# Patient Record
Sex: FEMALE | Race: WHITE | NOT HISPANIC OR LATINO | Employment: OTHER | URBAN - METROPOLITAN AREA
[De-identification: names, ages, dates, MRNs, and addresses within clinical notes are randomized per-mention and may not be internally consistent; named-entity substitution may affect disease eponyms.]

---

## 2017-01-05 ENCOUNTER — ALLSCRIPTS OFFICE VISIT (OUTPATIENT)
Dept: OTHER | Facility: OTHER | Age: 57
End: 2017-01-05

## 2017-01-20 ENCOUNTER — HOSPITAL ENCOUNTER (OUTPATIENT)
Dept: RADIOLOGY | Facility: CLINIC | Age: 57
Discharge: HOME | End: 2017-01-20
Payer: MEDICARE

## 2017-01-20 ENCOUNTER — ALLSCRIPTS OFFICE VISIT (OUTPATIENT)
Dept: OTHER | Facility: OTHER | Age: 57
End: 2017-01-20

## 2017-01-20 DIAGNOSIS — M25.519 PAIN IN SHOULDER: ICD-10-CM

## 2017-01-20 DIAGNOSIS — M25.512 PAIN IN LEFT SHOULDER: ICD-10-CM

## 2017-01-20 PROCEDURE — 73030 X-RAY EXAM OF SHOULDER: CPT

## 2017-01-26 ENCOUNTER — HOSPITAL ENCOUNTER (OUTPATIENT)
Dept: CT IMAGING | Facility: HOSPITAL | Age: 57
Discharge: HOME | End: 2017-01-26
Attending: ORTHOPAEDIC SURGERY
Payer: MEDICARE

## 2017-01-26 DIAGNOSIS — M25.512 PAIN IN LEFT SHOULDER: ICD-10-CM

## 2017-01-26 PROCEDURE — 73200 CT UPPER EXTREMITY W/O DYE: CPT

## 2017-01-31 ENCOUNTER — ALLSCRIPTS OFFICE VISIT (OUTPATIENT)
Dept: OTHER | Facility: OTHER | Age: 57
End: 2017-01-31

## 2017-08-04 ENCOUNTER — ALLSCRIPTS OFFICE VISIT (OUTPATIENT)
Dept: OTHER | Facility: OTHER | Age: 57
End: 2017-08-04

## 2018-01-10 NOTE — CONSULTS
Assessment    1  Impingement syndrome of left shoulder (726 2) (M75 42)   2  Biceps tendinitis of left upper extremity (726 12) (M75 22)   3  Trapezius muscle spasm (728 85) (M62 838)   4  Strain of left trapezius muscle, subsequent encounter (V58 89,840 8) (E51 868K)    Plan  Impingement syndrome of left shoulder    · 1 - Nikki Balderrama MD, Kasey Millard  (Orthopedic Surgery) Physician Referral  Consult Only: the  expectation is that the referring provider will communicate back to the patient on  treatment options  Evaluation and Treatment: the expectation is that the referred to  provider will communicate back to the patient on treatment options  Status: Active   Requested for: [id number]  Care Summary provided  : Yes  Strain of left trapezius muscle, subsequent encounter    · Indomethacin 25 MG Oral Capsule; Take one capsule with breakfanst and one with  evening meal  Strain of left trapezius muscle, subsequent encounter, Trapezius muscle spasm    · Follow-up visit in 6 weeks Evaluation and Treatment  Follow-up  Status: Hold For -  Scheduling  Requested for: [id number]    Discussion/Summary  Impression: seems like more of a shoulder problem  Currently, the condition is improving  Medication changes are as documented in orders  Treatment plan includes Orthopedic consultation possible injection  Patient discussion: discussed with the patient  Chief Complaint  PCP consult for left neck and shoulder pain  History of Present Illness  64 yo RHD female with Hx of cervical fusion 10 years ago at [facility]) which was helpful  Had left RTC repair 2005 Dr Laisha Vargas   also helpful  Last few months having non-traumatic pain in left trap area, had some numbness in left hand but that has resolved with four weeks of PTx   is scheduled to continue  No gait problems, no bowel or bladder incontinence  Had one x-ray at facility   "arthritis"        Review of Systems    Cardiovascular: No complaints of chest pain, no palpitations, no leg claudication or lower extremity edema  Respiratory: asthmatic  Musculoskeletal: as noted in HPI  Integumentary: anterior chest and a rash  Neurological: transient  Endocrine: muscle weakness, but No complaints of muscle weakness, no feelings of weakness, no frequent urination, no excessive thirst      ROS reviewed  Active Problems    1  Bipolar disorder (296 80) (F31 9)   2  Contusion of left foot (924 20) (S90 32XA)   3  Depression (311) (F32 9)   4  Hyperlipidemia (272 4) (E78 5)   5  Hypertension (401 9) (I10)   6  Hypothyroidism (244 9) (E03 9)   7  Left foot pain (729 5) (M79 672)   8  Schizoaffective disorder (295 70) (F25 9)    Past Medical History    1  History of Depression with anxiety (300 4) (F41 8)   2  History of asthma (V12 69) (Z87 09)   3  History of Psychogenic disorder (300 9) (F48 9)   4  History of Thyroid trouble (246 9) (E07 9)    The active problems and past medical history were reviewed and updated today  Surgical History    1  History of Back Surgery   2  History of Cervical Vertebral Fusion   3  History of  Section   4  History of Shoulder Surgery    The surgical history was reviewed and updated today  Family History  Mother    1  Maternal history of Cancer (199 1) (C80 1)   2  Maternal history of Heart disease (429 9) (I51 9)  Father    3  Maternal history of Cancer (199 1) (C80 1)   4  Maternal history of Heart disease (429 9) (I51 9)  Family History    5  Family history of cerebrovascular accident (CVA) (V17 1) (Z82 3)   6  Family history of diabetes mellitus (V18 0) (Z83 3)   7  Family history of hypertension (V17 49) (Z82 49)   8  Family history of neuropathy (V17 2) (Z82 0)   9  Family history of thyroid disease (V18 19) (Z83 49)    The family history was reviewed and updated today  Social History    · Never a smoker   · No alcohol use  The social history was reviewed and is unchanged  Current Meds   1   AmLODIPine Besylate 2 5 MG Oral Tablet; TAKE 1 TABLET DAILY; Therapy: (Recorded:38Sbt6002) to Recorded   2  Breo Ellipta 100-25 MCG/INH Inhalation Aerosol Powder Breath Activated; Therapy: (Recorded:30Sep2016) to Recorded   3  CeleBREX 200 MG Oral Capsule; take 1 capsule daily; Therapy: (Recorded:91Hzw7075) to Recorded   4  ClonazePAM 0 5 MG Oral Tablet; TAKE 1 TABLET 3 TIMES DAILY; Therapy: (Recorded:30Sep2016) to Recorded   5  DULoxetine HCl - 30 MG Oral Capsule Delayed Release Particles; Therapy: (Recorded:64Cjl6556) to Recorded   6  Flonase 50 MCG/ACT SUSP; Therapy: (Recorded:30Sep2016) to Recorded   7  Folic Acid 1 MG Oral Tablet; Take 1 tablet daily; Therapy: (Recorded:30Sep2016) to Recorded   8  Gabapentin 300 MG TABS; TAKE 1 TABLET 3 TIMES DAILY; Therapy: (Recorded:30Sep2016) to Recorded   9  HydrOXYzine HCl - 25 MG Oral Tablet; TAKE 1 TABLET Every 6 hours; Therapy: (Recorded:30Sep2016) to Recorded   10  LamoTRIgine 100 MG Oral Tablet; TAKE 1 TABLET DAILY; Therapy: (Recorded:46Wsg2196) to Recorded   11  Latuda 80 MG Oral Tablet; TAKE 1 TABLET Bedtime; Therapy: (Recorded:30Sep2016) to Recorded   12  Levothyroxine Sodium 125 MCG Oral Tablet; TAKE 1 TABLET DAILY; Therapy: (Recorded:61Qsf5625) to Recorded   13  Oxybutynin Chloride 5 MG Oral Tablet; Take 1 tablet daily; Therapy: (Recorded:51Ftc5012) to Recorded   14  Oxycodone-Acetaminophen 5-325 MG Oral Tablet; TAKE 1 TABLET 3 times daily PRN; Therapy: (Recorded:68Xyi2031) to Recorded   15  Pravastatin Sodium 40 MG Oral Tablet; TAKE 1 TABLET DAILY; Therapy: (Recorded:16Okr0897) to Recorded   16  ProAir  (90 Base) MCG/ACT Inhalation Aerosol Solution; Therapy: (Recorded:57Fdu3531) to Recorded   17  Vitamin D3 40847 UNIT Oral Capsule; 1 CAP PO MONTHLY; Therapy: (Recorded:49Jli5201) to Recorded    Allergies    1  morphine   2  Mucinex TB12   3  Penicillins   4  Tetracycline HCl CAPS   5   Tylenol with Codeine #3 TABS    Vitals  Signs   Recorded: 28SYO0515 02:20PM   Heart Rate: 76  Systolic: 698  Diastolic: 96  Height: 5 ft 4 in  Weight: 255 lb   BMI Calculated: 43 77  BSA Calculated: 2 18    Physical Exam  No focal or lateralizing weakness in UEs, no long tract signs  Abduction: painful restricted AROM 100 deg degrees  External rotation: painful restricted AROM 45 deg degrees  Special Tests: positive Painful Arc, positive Remy test, positive Neer test, positive Empty Can test, positive Speed's test and positive Hornblowers' test, but negative Drop Arm test    Constitutional - General appearance: Abnormal  morbidly obese  Musculoskeletal - Gait and station: Normal  Digits and nails: Normal  Inspection/palpation of joints, bones, and muscles: Normal  Muscle strength/tone: Normal    Cardiovascular - Pulses: Normal    Neurologic - Cranial nerves: Normal  Reflexes: Abnormal  Deep tendon reflexes: 1+ right biceps, 1+ left biceps, 1+ right triceps, 1+ left triceps, 1+ right brachioradialis, 1+ left brachioradialis, 0 right patella, 0 left patella, 0 right ankle jerk and 0 left ankle jerkno ankle clonus on the right and no ankle clonus on the left        Signatures   Electronically signed by : Abbey Barajas DO; Jan 5 2017  2:47PM EST                       (Author)

## 2018-01-12 VITALS
HEIGHT: 64 IN | SYSTOLIC BLOOD PRESSURE: 130 MMHG | BODY MASS INDEX: 43.54 KG/M2 | HEART RATE: 76 BPM | DIASTOLIC BLOOD PRESSURE: 96 MMHG | WEIGHT: 255 LBS

## 2018-01-13 VITALS
WEIGHT: 255 LBS | DIASTOLIC BLOOD PRESSURE: 70 MMHG | SYSTOLIC BLOOD PRESSURE: 176 MMHG | HEIGHT: 64 IN | HEART RATE: 69 BPM | BODY MASS INDEX: 43.54 KG/M2

## 2018-01-13 VITALS — DIASTOLIC BLOOD PRESSURE: 100 MMHG | HEART RATE: 87 BPM | SYSTOLIC BLOOD PRESSURE: 173 MMHG

## 2018-01-14 VITALS — DIASTOLIC BLOOD PRESSURE: 79 MMHG | SYSTOLIC BLOOD PRESSURE: 147 MMHG | HEART RATE: 83 BPM

## 2018-04-10 ENCOUNTER — HOSPITAL ENCOUNTER (INPATIENT)
Facility: HOSPITAL | Age: 58
LOS: 8 days | Discharge: NURSING, REHAB OR LONG-TERM CARE | DRG: 885 | End: 2018-04-18
Attending: EMERGENCY MEDICINE | Admitting: PSYCHIATRY & NEUROLOGY
Payer: MEDICARE

## 2018-04-10 DIAGNOSIS — F25.0 SCHIZOAFFECTIVE DISORDER, BIPOLAR TYPE (HCC): Chronic | ICD-10-CM

## 2018-04-10 DIAGNOSIS — Z00.8 MEDICAL CLEARANCE FOR PSYCHIATRIC ADMISSION: Primary | ICD-10-CM

## 2018-04-10 DIAGNOSIS — R45.851 SUICIDAL IDEATIONS: ICD-10-CM

## 2018-04-10 DIAGNOSIS — R44.3 HALLUCINATIONS: ICD-10-CM

## 2018-04-10 PROBLEM — M62.838 TRAPEZIUS MUSCLE SPASM: Status: ACTIVE | Noted: 2017-01-05

## 2018-04-10 PROBLEM — F43.10 PTSD (POST-TRAUMATIC STRESS DISORDER): Status: ACTIVE | Noted: 2017-05-17

## 2018-04-10 PROBLEM — M19.012 PRIMARY LOCALIZED OSTEOARTHROSIS OF LEFT SHOULDER: Status: ACTIVE | Noted: 2017-01-31

## 2018-04-10 LAB
ALBUMIN SERPL BCP-MCNC: 3.7 G/DL (ref 3.5–5)
ALP SERPL-CCNC: 125 U/L (ref 46–116)
ALT SERPL W P-5'-P-CCNC: 16 U/L (ref 12–78)
AMPHETAMINES SERPL QL SCN: NEGATIVE
ANION GAP SERPL CALCULATED.3IONS-SCNC: 4 MMOL/L (ref 4–13)
AST SERPL W P-5'-P-CCNC: 12 U/L (ref 5–45)
ATRIAL RATE: 71 BPM
BACTERIA UR QL AUTO: ABNORMAL /HPF
BARBITURATES UR QL: NEGATIVE
BASOPHILS # BLD AUTO: 0.04 THOUSANDS/ΜL (ref 0–0.1)
BASOPHILS NFR BLD AUTO: 1 % (ref 0–1)
BENZODIAZ UR QL: NEGATIVE
BILIRUB SERPL-MCNC: 0.38 MG/DL (ref 0.2–1)
BILIRUB UR QL STRIP: NEGATIVE
BUN SERPL-MCNC: 25 MG/DL (ref 5–25)
CALCIUM SERPL-MCNC: 9.2 MG/DL
CHLORIDE SERPL-SCNC: 104 MMOL/L (ref 100–108)
CLARITY UR: CLEAR
CO2 SERPL-SCNC: 31 MMOL/L (ref 21–32)
COCAINE UR QL: NEGATIVE
COLOR UR: YELLOW
COLOR, POC: NORMAL
CREAT SERPL-MCNC: 1.35 MG/DL (ref 0.6–1.3)
EOSINOPHIL # BLD AUTO: 0.55 THOUSAND/ΜL (ref 0–0.61)
EOSINOPHIL NFR BLD AUTO: 6 % (ref 0–6)
ERYTHROCYTE [DISTWIDTH] IN BLOOD BY AUTOMATED COUNT: 14.6 % (ref 11.6–15.1)
ETHANOL EXG-MCNC: NEGATIVE MG/DL
GFR SERPL CREATININE-BSD FRML MDRD: 44 ML/MIN/1.73SQ M
GLUCOSE SERPL-MCNC: 130 MG/DL (ref 65–140)
GLUCOSE UR STRIP-MCNC: NEGATIVE MG/DL
HCT VFR BLD AUTO: 42.5 % (ref 34.8–46.1)
HGB BLD-MCNC: 13.4 G/DL (ref 11.5–15.4)
HGB UR QL STRIP.AUTO: NEGATIVE
HYALINE CASTS #/AREA URNS LPF: ABNORMAL /LPF
KETONES UR STRIP-MCNC: NEGATIVE MG/DL
LEUKOCYTE ESTERASE UR QL STRIP: ABNORMAL
LYMPHOCYTES # BLD AUTO: 1.47 THOUSANDS/ΜL (ref 0.6–4.47)
LYMPHOCYTES NFR BLD AUTO: 17 % (ref 14–44)
MCH RBC QN AUTO: 27.8 PG (ref 26.8–34.3)
MCHC RBC AUTO-ENTMCNC: 31.5 G/DL (ref 31.4–37.4)
MCV RBC AUTO: 88 FL (ref 82–98)
METHADONE UR QL: NEGATIVE
MONOCYTES # BLD AUTO: 0.4 THOUSAND/ΜL (ref 0.17–1.22)
MONOCYTES NFR BLD AUTO: 5 % (ref 4–12)
NEUTROPHILS # BLD AUTO: 6.19 THOUSANDS/ΜL (ref 1.85–7.62)
NEUTS SEG NFR BLD AUTO: 71 % (ref 43–75)
NITRITE UR QL STRIP: NEGATIVE
NON-SQ EPI CELLS URNS QL MICRO: ABNORMAL /HPF
NRBC BLD AUTO-RTO: 0 /100 WBCS
OPIATES UR QL SCN: NEGATIVE
P AXIS: 50 DEGREES
PCP UR QL: NEGATIVE
PH UR STRIP.AUTO: 7 [PH] (ref 4.5–8)
PLATELET # BLD AUTO: 177 THOUSANDS/UL (ref 149–390)
PMV BLD AUTO: 9.1 FL (ref 8.9–12.7)
POTASSIUM SERPL-SCNC: 4.9 MMOL/L (ref 3.5–5.3)
PR INTERVAL: 222 MS
PROT SERPL-MCNC: 7.1 G/DL (ref 6.4–8.2)
PROT UR STRIP-MCNC: NEGATIVE MG/DL
QRS AXIS: 22 DEGREES
QRSD INTERVAL: 88 MS
QT INTERVAL: 388 MS
QTC INTERVAL: 421 MS
RBC # BLD AUTO: 4.82 MILLION/UL (ref 3.81–5.12)
RBC #/AREA URNS AUTO: ABNORMAL /HPF
SODIUM SERPL-SCNC: 139 MMOL/L (ref 136–145)
SP GR UR STRIP.AUTO: 1.01 (ref 1–1.03)
T WAVE AXIS: 51 DEGREES
THC UR QL: NEGATIVE
TSH SERPL DL<=0.05 MIU/L-ACNC: 2.52 UIU/ML (ref 0.36–3.74)
UROBILINOGEN UR QL STRIP.AUTO: 0.2 E.U./DL
VENTRICULAR RATE: 71 BPM
WBC # BLD AUTO: 8.67 THOUSAND/UL (ref 4.31–10.16)
WBC #/AREA URNS AUTO: ABNORMAL /HPF

## 2018-04-10 PROCEDURE — 80307 DRUG TEST PRSMV CHEM ANLYZR: CPT | Performed by: EMERGENCY MEDICINE

## 2018-04-10 PROCEDURE — 80053 COMPREHEN METABOLIC PANEL: CPT | Performed by: EMERGENCY MEDICINE

## 2018-04-10 PROCEDURE — 93005 ELECTROCARDIOGRAM TRACING: CPT

## 2018-04-10 PROCEDURE — 81002 URINALYSIS NONAUTO W/O SCOPE: CPT | Performed by: EMERGENCY MEDICINE

## 2018-04-10 PROCEDURE — 90715 TDAP VACCINE 7 YRS/> IM: CPT | Performed by: EMERGENCY MEDICINE

## 2018-04-10 PROCEDURE — 85025 COMPLETE CBC W/AUTO DIFF WBC: CPT | Performed by: EMERGENCY MEDICINE

## 2018-04-10 PROCEDURE — 99285 EMERGENCY DEPT VISIT HI MDM: CPT

## 2018-04-10 PROCEDURE — 81001 URINALYSIS AUTO W/SCOPE: CPT

## 2018-04-10 PROCEDURE — 87086 URINE CULTURE/COLONY COUNT: CPT

## 2018-04-10 PROCEDURE — 82075 ASSAY OF BREATH ETHANOL: CPT | Performed by: EMERGENCY MEDICINE

## 2018-04-10 PROCEDURE — 90471 IMMUNIZATION ADMIN: CPT

## 2018-04-10 PROCEDURE — 36415 COLL VENOUS BLD VENIPUNCTURE: CPT | Performed by: EMERGENCY MEDICINE

## 2018-04-10 PROCEDURE — 93010 ELECTROCARDIOGRAM REPORT: CPT | Performed by: INTERNAL MEDICINE

## 2018-04-10 PROCEDURE — 84443 ASSAY THYROID STIM HORMONE: CPT | Performed by: EMERGENCY MEDICINE

## 2018-04-10 PROCEDURE — 99232 SBSQ HOSP IP/OBS MODERATE 35: CPT | Performed by: PHYSICIAN ASSISTANT

## 2018-04-10 RX ORDER — HALOPERIDOL 5 MG
5 TABLET ORAL EVERY 6 HOURS PRN
Status: DISCONTINUED | OUTPATIENT
Start: 2018-04-10 | End: 2018-04-18 | Stop reason: HOSPADM

## 2018-04-10 RX ORDER — LORAZEPAM 1 MG/1
1 TABLET ORAL EVERY 6 HOURS PRN
Status: DISCONTINUED | OUTPATIENT
Start: 2018-04-10 | End: 2018-04-18 | Stop reason: HOSPADM

## 2018-04-10 RX ORDER — OXYBUTYNIN CHLORIDE 5 MG/1
15 TABLET, EXTENDED RELEASE ORAL DAILY
COMMUNITY
End: 2021-01-01 | Stop reason: HOSPADM

## 2018-04-10 RX ORDER — PRAVASTATIN SODIUM 40 MG
1 TABLET ORAL DAILY
COMMUNITY
End: 2021-01-01 | Stop reason: HOSPADM

## 2018-04-10 RX ORDER — ACETAMINOPHEN 325 MG/1
TABLET ORAL
COMMUNITY
End: 2018-04-10 | Stop reason: ALTCHOICE

## 2018-04-10 RX ORDER — FOLIC ACID 1 MG/1
1 TABLET ORAL
COMMUNITY
Start: 2014-08-05 | End: 2021-01-01 | Stop reason: HOSPADM

## 2018-04-10 RX ORDER — CHOLECALCIFEROL (VITAMIN D3) 125 MCG
CAPSULE ORAL
COMMUNITY
End: 2021-01-01 | Stop reason: HOSPADM

## 2018-04-10 RX ORDER — MAGNESIUM HYDROXIDE/ALUMINUM HYDROXICE/SIMETHICONE 120; 1200; 1200 MG/30ML; MG/30ML; MG/30ML
30 SUSPENSION ORAL EVERY 4 HOURS PRN
Status: DISCONTINUED | OUTPATIENT
Start: 2018-04-10 | End: 2018-04-18 | Stop reason: HOSPADM

## 2018-04-10 RX ORDER — ZIPRASIDONE MESYLATE 20 MG/ML
20 INJECTION, POWDER, LYOPHILIZED, FOR SOLUTION INTRAMUSCULAR EVERY 4 HOURS PRN
Status: DISCONTINUED | OUTPATIENT
Start: 2018-04-10 | End: 2018-04-18 | Stop reason: HOSPADM

## 2018-04-10 RX ORDER — CLONAZEPAM 0.25 MG/1
0.75 TABLET, ORALLY DISINTEGRATING ORAL
COMMUNITY
Start: 2017-08-09 | End: 2018-04-18 | Stop reason: HOSPADM

## 2018-04-10 RX ORDER — CLONAZEPAM 0.5 MG/1
0.75 TABLET ORAL
COMMUNITY
End: 2018-04-18 | Stop reason: HOSPADM

## 2018-04-10 RX ORDER — GABAPENTIN 600 MG/1
600 TABLET ORAL 3 TIMES DAILY
COMMUNITY
Start: 2017-07-05 | End: 2021-01-01 | Stop reason: HOSPADM

## 2018-04-10 RX ORDER — LAMOTRIGINE 200 MG/1
200 TABLET ORAL DAILY
Status: ON HOLD | COMMUNITY
Start: 2017-08-09 | End: 2018-04-18

## 2018-04-10 RX ORDER — HYDROXYZINE HYDROCHLORIDE 25 MG/1
50 TABLET, FILM COATED ORAL EVERY 6 HOURS
COMMUNITY
End: 2021-01-01 | Stop reason: HOSPADM

## 2018-04-10 RX ORDER — OLANZAPINE 5 MG/1
5 TABLET ORAL
Status: DISCONTINUED | OUTPATIENT
Start: 2018-04-10 | End: 2018-04-18 | Stop reason: HOSPADM

## 2018-04-10 RX ORDER — BISACODYL 10 MG
SUPPOSITORY, RECTAL RECTAL
COMMUNITY
End: 2021-01-01 | Stop reason: HOSPADM

## 2018-04-10 RX ORDER — ALBUTEROL SULFATE 90 UG/1
2 AEROSOL, METERED RESPIRATORY (INHALATION) EVERY 6 HOURS
COMMUNITY
End: 2021-01-01 | Stop reason: HOSPADM

## 2018-04-10 RX ORDER — AMLODIPINE BESYLATE 2.5 MG/1
2.5 TABLET ORAL DAILY
COMMUNITY
End: 2021-01-01 | Stop reason: HOSPADM

## 2018-04-10 RX ORDER — OXYCODONE HYDROCHLORIDE AND ACETAMINOPHEN 5; 325 MG/1; MG/1
1 TABLET ORAL EVERY 8 HOURS
COMMUNITY
End: 2018-04-10 | Stop reason: ALTCHOICE

## 2018-04-10 RX ORDER — TRAZODONE HYDROCHLORIDE 50 MG/1
50 TABLET ORAL
COMMUNITY
End: 2021-01-01 | Stop reason: HOSPADM

## 2018-04-10 RX ORDER — LORAZEPAM 2 MG/ML
2 INJECTION INTRAMUSCULAR EVERY 4 HOURS PRN
Status: DISCONTINUED | OUTPATIENT
Start: 2018-04-10 | End: 2018-04-18 | Stop reason: HOSPADM

## 2018-04-10 RX ORDER — CLONAZEPAM 0.5 MG/1
1 TABLET ORAL
COMMUNITY
End: 2018-04-18 | Stop reason: HOSPADM

## 2018-04-10 RX ORDER — CELECOXIB 200 MG/1
1 CAPSULE ORAL DAILY
COMMUNITY
End: 2021-01-01 | Stop reason: HOSPADM

## 2018-04-10 RX ORDER — RISPERIDONE 1 MG/1
1 TABLET, ORALLY DISINTEGRATING ORAL
Status: DISCONTINUED | OUTPATIENT
Start: 2018-04-10 | End: 2018-04-18 | Stop reason: HOSPADM

## 2018-04-10 RX ORDER — FLUTICASONE PROPIONATE 50 MCG
SPRAY, SUSPENSION (ML) NASAL
Status: ON HOLD | COMMUNITY
End: 2018-04-11 | Stop reason: SDUPTHER

## 2018-04-10 RX ORDER — CETIRIZINE HYDROCHLORIDE 10 MG/1
10 TABLET ORAL DAILY
COMMUNITY
End: 2021-01-01 | Stop reason: HOSPADM

## 2018-04-10 RX ORDER — BENZTROPINE MESYLATE 1 MG/1
1 TABLET ORAL
Status: DISCONTINUED | OUTPATIENT
Start: 2018-04-10 | End: 2018-04-18 | Stop reason: HOSPADM

## 2018-04-10 RX ORDER — LEVOTHYROXINE SODIUM 137 UG/1
150 TABLET ORAL
COMMUNITY
End: 2021-01-01 | Stop reason: HOSPADM

## 2018-04-10 RX ORDER — DULOXETIN HYDROCHLORIDE 60 MG/1
120 CAPSULE, DELAYED RELEASE ORAL DAILY
Status: ON HOLD | COMMUNITY
Start: 2017-05-17 | End: 2018-04-18

## 2018-04-10 RX ORDER — BENZTROPINE MESYLATE 1 MG/ML
1 INJECTION INTRAMUSCULAR; INTRAVENOUS
Status: DISCONTINUED | OUTPATIENT
Start: 2018-04-10 | End: 2018-04-18 | Stop reason: HOSPADM

## 2018-04-10 RX ORDER — HALOPERIDOL 5 MG/ML
5 INJECTION INTRAMUSCULAR EVERY 6 HOURS PRN
Status: DISCONTINUED | OUTPATIENT
Start: 2018-04-10 | End: 2018-04-18 | Stop reason: HOSPADM

## 2018-04-10 RX ORDER — TRAZODONE HYDROCHLORIDE 50 MG/1
50 TABLET ORAL
Status: DISCONTINUED | OUTPATIENT
Start: 2018-04-10 | End: 2018-04-18 | Stop reason: HOSPADM

## 2018-04-10 RX ORDER — FLUTICASONE FUROATE AND VILANTEROL 100; 25 UG/1; UG/1
POWDER RESPIRATORY (INHALATION)
COMMUNITY
End: 2021-01-01 | Stop reason: HOSPADM

## 2018-04-10 RX ADMIN — TETANUS TOXOID, REDUCED DIPHTHERIA TOXOID AND ACELLULAR PERTUSSIS VACCINE, ADSORBED 0.5 ML: 5; 2.5; 8; 8; 2.5 SUSPENSION INTRAMUSCULAR at 16:49

## 2018-04-10 RX ADMIN — TRAZODONE HYDROCHLORIDE 50 MG: 50 TABLET ORAL at 21:07

## 2018-04-10 NOTE — ED NOTES
CW checked EVS and pt MA is fee for service  PT only insurance is medicare part A and B  No pre-cert is required

## 2018-04-10 NOTE — ED PROVIDER NOTES
History  Chief Complaint   Patient presents with    Suicidal     "there has been a man and a woman in a grey outfits telling me to stab myself with a knife  i have been stabbing myself with a knife at the nursing home  today i feel like i will definately kill myself with a knife "      62 y/o female presenting o the ER with a chief complaint of SI with visual/auditory hallucinations  Patient states that for the past two months she has continually seen two figures both of which have been instructing her to kill herself  Patient had resisted these instructions but states that over the past two days the commands have become stronger and that she has had thoughts of "stabbing herself"  Patient lives at a local nursing home and was transported to this ER for evaluation  Altered Mental Status   Presenting symptoms: behavior changes    Severity:  Severe  Most recent episode: Today  Episode history:  Multiple  Timing:  Constant  Progression:  Worsening  Chronicity:  Recurrent  Context: nursing home resident    Context: not alcohol use, not dementia, not drug use, not head injury, not homeless, taking medications as prescribed, not recent change in medication and not recent infection    Associated symptoms: hallucinations    Associated symptoms: no abdominal pain, no fever, no nausea and no vomiting        Prior to Admission Medications   Prescriptions Last Dose Informant Patient Reported? Taking?    Cholecalciferol (VITAMIN D3) 57897 units TABS   Yes Yes   Sig: Take by mouth daily at bedtime   DULoxetine (CYMBALTA) 60 mg delayed release capsule   Yes Yes   Sig: Take 120 mg by mouth daily   Melatonin 5 MG TABS   Yes Yes   Sig: Take by mouth   albuterol (PROVENTIL HFA,VENTOLIN HFA) 90 mcg/act inhaler   Yes Yes   Sig: Inhale 2 puffs every 6 (six) hours   amLODIPine (NORVASC) 2 5 mg tablet   Yes Yes   Sig: Take 2 5 mg by mouth daily   bisacodyl (BISAC-EVAC) 10 mg suppository   Yes Yes   Sig: Insert into the rectum celecoxib (CELEBREX) 200 mg capsule   Yes Yes   Sig: Take 1 capsule by mouth daily   cetirizine (ZyrTEC) 10 mg tablet   Yes Yes   Sig: Take 10 mg by mouth daily   clonazePAM (KlonoPIN) 0 25 MG disintegrating tablet   Yes Yes   Sig: Take 0 75 mg by mouth daily at bedtime   clonazePAM (KlonoPIN) 0 5 mg tablet   Yes Yes   Sig: Take 1 tablet by mouth daily in the early morning   clonazePAM (KlonoPIN) 0 5 mg tablet   Yes Yes   Sig: Take 0 75 mg by mouth daily with lunch   fluticasone (FLONASE) 50 mcg/act nasal spray   Yes Yes   Sig: into each nostril   fluticasone furoate-vilanterol (BREO ELLIPTA)   Yes Yes   Sig: Inhale   folic acid (FOLVITE) 1 mg tablet   Yes Yes   Sig: Take 1 mg by mouth   gabapentin (NEURONTIN) 600 MG tablet   Yes Yes   Sig: Take 600 mg by mouth 3 (three) times a day   hydrOXYzine HCL (ATARAX) 25 mg tablet   Yes Yes   Sig: Take 1 tablet by mouth every 6 (six) hours   lamoTRIgine (LaMICtal) 200 MG tablet   Yes Yes   Sig: Take 200 mg by mouth daily   levothyroxine 137 mcg tablet   Yes Yes   Sig: Take 137 mcg by mouth   lurasidone (LATUDA) 80 mg tablet   Yes Yes   Sig: Take 120 mg by mouth daily with dinner   magnesium hydroxide (MILK OF MAGNESIA) 400 mg/5 mL oral suspension   Yes Yes   Sig: Take by mouth   oxybutynin (DITROPAN-XL) 5 mg 24 hr tablet   Yes Yes   Sig: Take 20 mg by mouth daily   pravastatin (PRAVACHOL) 40 mg tablet   Yes Yes   Sig: Take 1 tablet by mouth daily   traZODone (DESYREL) 50 mg tablet   Yes Yes   Sig: Take 50 mg by mouth daily at bedtime      Facility-Administered Medications: None       Past Medical History:   Diagnosis Date    Allergic rhinitis     Anxiety     Asthma     Bipolar 2 disorder (HCC)     Chronic left shoulder pain     Depression     Dizziness     Dizziness and giddiness     Gait abnormality     Hyperlipidemia     Hypertension     Hypothyroidism     Insomnia     Osteoarthritis     Overactive bladder     [facility]ia ([facility] [address] )     [facility] [address] )        No past surgical history on file  History reviewed  No pertinent family history  I have reviewed and agree with the history as documented  Social History   Substance Use Topics    Smoking status: Never Smoker    Smokeless tobacco: Never Used    Alcohol use No        Review of Systems   Constitutional: Negative  Negative for appetite change, chills, diaphoresis, fatigue and fever  HENT: Negative  Eyes: Negative  Respiratory: Negative  Cardiovascular: Negative  Gastrointestinal: Negative for abdominal pain, blood in stool, constipation, diarrhea, nausea and vomiting  Endocrine: Negative  Genitourinary: Negative for decreased urine volume, difficulty urinating, dyspareunia, dysuria, flank pain, frequency, hematuria, pelvic pain, urgency, vaginal bleeding, vaginal discharge and vaginal pain  Musculoskeletal: Negative  Skin: Negative  Allergic/Immunologic: Negative  Neurological: Negative  Psychiatric/Behavioral: Positive for behavioral problems, hallucinations and suicidal ideas  All other systems reviewed and are negative  Physical Exam  ED Triage Vitals   Temperature Pulse Respirations Blood Pressure SpO2   04/10/18 1838 04/10/18 1551 04/10/18 1551 04/10/18 1551 04/10/18 1551   98 1 °F (36 7 °C) 75 18 140/63 97 %      Temp Source Heart Rate Source Patient Position - Orthostatic VS BP Location FiO2 (%)   04/10/18 1838 04/10/18 1551 04/10/18 1551 04/10/18 1551 --   Tympanic Monitor Lying Left arm       Pain Score       --                  Orthostatic Vital Signs  Vitals:    04/10/18 1551 04/10/18 1838   BP: 140/63 (!) 172/96   Pulse: 75 93   Patient Position - Orthostatic VS: Lying Sitting       Physical Exam   Constitutional: She is oriented to person, place, and time  She appears well-developed and well-nourished  HENT:   Head: Normocephalic and atraumatic     Right Ear: External ear normal    Left Ear: External ear normal    Nose: Nose normal  Mouth/Throat: Oropharynx is clear and moist    Eyes: Conjunctivae and EOM are normal  Pupils are equal, round, and reactive to light  Neck: Normal range of motion  Neck supple  No JVD present  No tracheal deviation present  No thyromegaly present  Cardiovascular: Normal rate, regular rhythm, normal heart sounds and intact distal pulses  Exam reveals no gallop and no friction rub  No murmur heard  Pulmonary/Chest: Effort normal and breath sounds normal  No stridor  No respiratory distress  She has no wheezes  She has no rales  She exhibits no tenderness  Abdominal: Soft  Bowel sounds are normal  She exhibits no distension and no mass  There is no tenderness  There is no rebound and no guarding  No hernia  Musculoskeletal: Normal range of motion  She exhibits no edema, tenderness or deformity  Lymphadenopathy:     She has no cervical adenopathy  Neurological: She is alert and oriented to person, place, and time  She has normal reflexes  She displays normal reflexes  No cranial nerve deficit or sensory deficit  She exhibits normal muscle tone  Coordination normal    Skin: Skin is warm  No rash noted  No erythema  No pallor  Psychiatric: She is actively hallucinating  She expresses suicidal ideation  She expresses suicidal plans  Nursing note and vitals reviewed        ED Medications  Medications   LORazepam (ATIVAN) tablet 1 mg (not administered)   LORazepam (ATIVAN) 2 mg/mL injection 2 mg (not administered)   traZODone (DESYREL) tablet 50 mg (50 mg Oral Given 4/10/18 2107)   risperiDONE (RisperDAL M-TABS) dispersible tablet 1 mg (not administered)   haloperidol (HALDOL) tablet 5 mg (not administered)   haloperidol lactate (HALDOL) injection 5 mg (not administered)   OLANZapine (ZyPREXA) tablet 5 mg (not administered)   ziprasidone (GEODON) IM injection 20 mg (not administered)   magnesium hydroxide (MILK OF MAGNESIA) 400 mg/5 mL oral suspension 30 mL (not administered)   aluminum-magnesium hydroxide-simethicone (MYLANTA) 200-200-20 mg/5 mL oral suspension 30 mL (not administered)   benztropine (COGENTIN) tablet 1 mg (not administered)   benztropine (COGENTIN) injection 1 mg (not administered)   tetanus-diphtheria-acellular pertussis (BOOSTRIX) IM injection 0 5 mL (0 5 mL Intramuscular Given 4/10/18 1649)       Diagnostic Studies  Results Reviewed     Procedure Component Value Units Date/Time    Rapid drug screen, urine [[id number]]  (Normal) Collected:  04/10/18 1647    Lab Status:  Final result Specimen:  Urine from Urine, Clean Catch Updated:  04/10/18 1707     Amph/Meth UR Negative     Barbiturate Ur Negative     Benzodiazepine Urine Negative     Cocaine Urine Negative     Methadone Urine Negative     Opiate Urine Negative     PCP Ur Negative     THC Urine Negative    Narrative:         FOR MEDICAL PURPOSES ONLY  IF CONFIRMATION NEEDED PLEASE CONTACT THE LAB WITHIN 5 DAYS  Drug Screen Cutoff Levels:  AMPHETAMINE/METHAMPHETAMINES  1000 ng/mL  BARBITURATES     200 ng/mL  BENZODIAZEPINES     200 ng/mL  COCAINE      300 ng/mL  METHADONE      300 ng/mL  OPIATES      300 ng/mL  PHENCYCLIDINE     25 ng/mL  THC       50 ng/mL    Urine Microscopic [[id number]]  (Abnormal) Collected:  04/10/18 1600    Lab Status:  Final result Specimen:  Urine from Urine, Clean Catch Updated:  04/10/18 1608     RBC, UA None Seen /hpf      WBC, UA 20-30 (A) /hpf      Epithelial Cells None Seen /hpf      Bacteria, UA Occasional /hpf      Hyaline Casts, UA None Seen /lpf     Urine culture [[id number]] Collected:  04/10/18 1600    Lab Status:   In process Specimen:  Urine from Urine, Clean Catch Updated:  04/10/18 1608    POCT urinalysis dipstick [[id number]]  (Normal) Resulted:  04/10/18 1600    Lab Status:  Final result Specimen:  Urine Updated:  04/10/18 1600     Color, UA see chart    ED Urine Macroscopic [[id number]]  (Abnormal) Collected:  04/10/18 1600    Lab Status:  Final result Specimen:  Urine Updated:  04/10/18 4776 Color, UA Yellow     Clarity, UA Clear     pH, UA 7 0     Leukocytes, UA Small (A)     Nitrite, UA Negative     Protein, UA Negative mg/dl      Glucose, UA Negative mg/dl      Ketones, UA Negative mg/dl      Urobilinogen, UA 0 2 E U /dl      Bilirubin, UA Negative     Blood, UA Negative     Specific Gravity, UA 1 015    Narrative:       CLINITEK RESULT    Comprehensive metabolic panel [[id number]]  (Abnormal) Collected:  04/10/18 1513    Lab Status:  Final result Specimen:  Blood from Arm, Left Updated:  04/10/18 1547     Sodium 139 mmol/L      Potassium 4 9 mmol/L      Chloride 104 mmol/L      CO2 31 mmol/L      Anion Gap 4 mmol/L      BUN 25 mg/dL      Creatinine 1 35 (H) mg/dL      Glucose 130 mg/dL      Calcium 9 2 mg/dL      AST 12 U/L      ALT 16 U/L      Alkaline Phosphatase 125 (H) U/L      Total Protein 7 1 g/dL      Albumin 3 7 g/dL      Total Bilirubin 0 38 mg/dL      eGFR 44 ml/min/1 73sq m     Narrative:         National Kidney Disease Education Program recommendations are as follows:  GFR calculation is accurate only with a steady state creatinine  Chronic Kidney disease less than 60 ml/min/1 73 sq  meters  Kidney failure less than 15 ml/min/1 73 sq  meters  TSH [[id number]]  (Normal) Collected:  04/10/18 1513    Lab Status:  Final result Specimen:  Blood from Arm, Left Updated:  04/10/18 1547     TSH 3RD GENERATON 2 520 uIU/mL     Narrative:         Patients undergoing fluorescein dye angiography may retain small amounts of fluorescein in the body for 48-72 hours post procedure  Samples containing fluorescein can produce falsely depressed TSH values  If the patient had this procedure,a specimen should be resubmitted post fluorescein clearance            The recommended reference ranges for TSH during pregnancy are as follows:  First trimester 0 1 to 2 5 uIU/mL  Second trimester  0 2 to 3 0 uIU/mL  Third trimester 0 3 to 3 0 uIU/m      CBC and differential [[id number]]  (Normal) Collected:  04/10/18 1513 Lab Status:  Final result Specimen:  Blood from Arm, Left Updated:  04/10/18 1523     WBC 8 67 Thousand/uL      RBC 4 82 Million/uL      Hemoglobin 13 4 g/dL      Hematocrit 42 5 %      MCV 88 fL      MCH 27 8 pg      MCHC 31 5 g/dL      RDW 14 6 %      MPV 9 1 fL      Platelets 616 Thousands/uL      nRBC 0 /100 WBCs      Neutrophils Relative 71 %      Lymphocytes Relative 17 %      Monocytes Relative 5 %      Eosinophils Relative 6 %      Basophils Relative 1 %      Neutrophils Absolute 6 19 Thousands/µL      Lymphocytes Absolute 1 47 Thousands/µL      Monocytes Absolute 0 40 Thousand/µL      Eosinophils Absolute 0 55 Thousand/µL      Basophils Absolute 0 04 Thousands/µL     POCT alcohol breath test [[id number]]  (Normal) Resulted:  04/10/18 1422    Lab Status:  Final result Updated:  04/10/18 1422     EXTBreath Alcohol negative                 No orders to display         Procedures  Procedures      Phone Consults  ED Phone Contact    ED Course  ED Course                                MDM  Number of Diagnoses or Management Options  Hallucinations: new and requires workup  Suicidal ideations: new and requires workup     Amount and/or Complexity of Data Reviewed  Clinical lab tests: ordered and reviewed  Tests in the radiology section of CPT®: ordered and reviewed  Tests in the medicine section of CPT®: reviewed and ordered  Decide to obtain previous medical records or to obtain history from someone other than the patient: yes  Independent visualization of images, tracings, or specimens: yes    Patient Progress  Patient progress: stable    CritCare Time    Disposition  Final diagnoses:   Suicidal ideations   Hallucinations     Time reflects when diagnosis was documented in both MDM as applicable and the Disposition within this note     Time User Action Codes Description Comment    4/10/2018  4:20 PM Eva BEAVERS Add [Z00 8] Medical clearance for psychiatric admission     4/10/2018  4:29 PM Kitty Damon Melchor Suicidal ideations     4/10/2018  4:30 PM Ashlyn Shepherd Add [R44 3] Hallucinations       ED Disposition     ED Disposition Condition Comment    Transfer to Another [place] [facility] should be transferred out to [facility] Nadene Curling MD Documentation    Donelda Deltona Most Recent Value   Accepting Physician  [address]  Name, [place] [address]   Sending MD DR Blanka Brenner      RN Documentation    Flowsheet Row Most Recent Value   Accepting Facility Name, [place] [address]      Follow-up Information    None       Current Discharge Medication List      CONTINUE these medications which have NOT CHANGED    Details   albuterol (PROVENTIL HFA,VENTOLIN HFA) 90 mcg/act inhaler Inhale 2 puffs every 6 (six) hours      amLODIPine (NORVASC) 2 5 mg tablet Take 2 5 mg by mouth daily      bisacodyl (BISAC-EVAC) 10 mg suppository Insert into the rectum      celecoxib (CELEBREX) 200 mg capsule Take 1 capsule by mouth daily      cetirizine (ZyrTEC) 10 mg tablet Take 10 mg by mouth daily      Cholecalciferol (VITAMIN D3) 21627 units TABS Take by mouth daily at bedtime      clonazePAM (KlonoPIN) 0 25 MG disintegrating tablet Take 0 75 mg by mouth daily at bedtime      !! clonazePAM (KlonoPIN) 0 5 mg tablet Take 1 tablet by mouth daily in the early morning      !! clonazePAM (KlonoPIN) 0 5 mg tablet Take 0 75 mg by mouth daily with lunch      DULoxetine (CYMBALTA) 60 mg delayed release capsule Take 120 mg by mouth daily      fluticasone (FLONASE) 50 mcg/act nasal spray into each nostril      fluticasone furoate-vilanterol (BREO ELLIPTA) Inhale      folic acid (FOLVITE) 1 mg tablet Take 1 mg by mouth      gabapentin (NEURONTIN) 600 MG tablet Take 600 mg by mouth 3 (three) times a day      hydrOXYzine HCL (ATARAX) 25 mg tablet Take 1 tablet by mouth every 6 (six) hours      lamoTRIgine (LaMICtal) 200 MG tablet Take 200 mg by mouth daily      levothyroxine 137 mcg tablet Take 137 mcg by mouth      lurasidone (LATUDA) 80 mg tablet Take 120 mg by mouth daily with dinner      magnesium hydroxide (MILK OF MAGNESIA) 400 mg/5 mL oral suspension Take by mouth      Melatonin 5 MG TABS Take by mouth      oxybutynin (DITROPAN-XL) 5 mg 24 hr tablet Take 20 mg by mouth daily      pravastatin (PRAVACHOL) 40 mg tablet Take 1 tablet by mouth daily      traZODone (DESYREL) 50 mg tablet Take 50 mg by mouth daily at bedtime       !! - Potential duplicate medications found  Please discuss with provider  No discharge procedures on file  ED Provider  Attending physically available and evaluated Marcela Demi MCCULLOUGH managed the patient along with the ED Attending      Electronically Signed by         Amanda Ward DO  04/10/18 0000

## 2018-04-10 NOTE — ED NOTES
Pt is a 63 y o  female who was brought to the ED with   Chief Complaint   Patient presents with    Suicidal     "there has been a man and a woman in a grey outfits telling me to stab myself with a knife  i have been stabbing myself with a knife at the nursing home  today i feel like i will definately kill myself with a knife "    Pt brought to the ED by ems from [facility]) with complaints of A/H command type pt reports that the voices are telling her to kill herself, Pt reports S/I with plan to stabb self with a knife  Pt report A/H for the past 2 months, pt reports increased A/H, Pt reports that she may act out what the voices are telling her  Pt admits to S/I, A/H   Pt denies H/I,V/H  Intake Assessment completed, Safety risk Assessment completed, CW met with pt and discussed the process of admission to  [facility] , pt has agreed and signed 201, CW discussed this case and pt plan with ED Physician who is in agreement with this plan   CW will start bed search, and complete Pre-Cert        Judith Ford Crisis Worker

## 2018-04-10 NOTE — ED NOTES
Patient is accepted at [facility]  Patient is accepted by [facility] per Unit nurse Lupe Abebe   Patient may go to the floor once bed is ready  Nurse report is to be called to [phone] prior to patient transfer

## 2018-04-10 NOTE — ED ATTENDING ATTESTATION
Ky Gr MD, saw and evaluated the patient  I have discussed the patient with the resident/non-physician practitioner and agree with the resident's/non-physician practitioner's findings, Plan of Care, and MDM as documented in the resident's/non-physician practitioner's note, except where noted  All available labs and Radiology studies were reviewed  At this point I agree with the current assessment done in the Emergency Department  I have conducted an independent evaluation of this patient including a focused history and a physical exam     71-year-old female presenting to the emergency department for evaluation suicidal ideation  Patient has baseline psychiatric disorder resides at a nursing facility  Patient's plan is to stab herself  Unremarkable exam   Geriatric psychiatry clearance and psychiatric referral     Labs Reviewed   COMPREHENSIVE METABOLIC PANEL - Abnormal        Result Value Ref Range Status    Sodium 139  136 - 145 mmol/L Final    Potassium 4 9  3 5 - 5 3 mmol/L Final    Chloride 104  100 - 108 mmol/L Final    CO2 31  21 - 32 mmol/L Final    Anion Gap 4  4 - 13 mmol/L Final    BUN 25  5 - 25 mg/dL Final    Creatinine 1 35 (*) 0 60 - 1 30 mg/dL Final    Comment: Standardized to IDMS reference method    Glucose 130  65 - 140 mg/dL Final    Comment:   If the patient is fasting, the ADA then defines impaired fasting glucose as > 100 mg/dL and diabetes as > or equal to 123 mg/dL  Specimen collection should occur prior to Sulfasalazine administration due to the potential for falsely depressed results  Specimen collection should occur prior to Sulfapyridine administration due to the potential for falsely elevated results  Calcium 9 2  mg/dL Final    AST 12  5 - 45 U/L Final    Comment:   Specimen collection should occur prior to Sulfasalazine administration due to the potential for falsely depressed results       ALT 16  12 - 78 U/L Final    Comment:   Specimen collection should occur prior to Sulfasalazine and/or Sulfapyridine administration due to the potential for falsely depressed results  Alkaline Phosphatase 125 (*) 46 - 116 U/L Final    Total Protein 7 1  6 4 - 8 2 g/dL Final    Albumin 3 7  3 5 - 5 0 g/dL Final    Total Bilirubin 0 38  0 20 - 1 00 mg/dL Final    eGFR 44  ml/min/1 73sq m Final    Narrative:     National Kidney Disease Education Program recommendations are as follows:  GFR calculation is accurate only with a steady state creatinine  Chronic Kidney disease less than 60 ml/min/1 73 sq  meters  Kidney failure less than 15 ml/min/1 73 sq  meters  URINE MICROSCOPIC - Abnormal     RBC, UA None Seen  None Seen, 0-5 /hpf Final    WBC, UA 20-30 (*) None Seen, 0-5, 5-55, 5-65 /hpf Final    Epithelial Cells None Seen  None Seen, Occasional /hpf Final    Bacteria, UA Occasional  None Seen, Occasional /hpf Final    Hyaline Casts, UA None Seen  None Seen /lpf Final   ED URINE MACROSCOPIC - Abnormal     Color, UA Yellow   Final    Clarity, UA Clear   Final    pH, UA 7 0  4 5 - 8 0 Final    Leukocytes, UA Small (*) Negative Final    Nitrite, UA Negative  Negative Final    Protein, UA Negative  Negative mg/dl Final    Glucose, UA Negative  Negative mg/dl Final    Ketones, UA Negative  Negative mg/dl Final    Urobilinogen, UA 0 2  0 2, 1 0 E U /dl E U /dl Final    Bilirubin, UA Negative  Negative Final    Blood, UA Negative  Negative Final    Specific [place], UA 1 015  1 003 - 1 030 Final    Narrative:     CLINITEK RESULT   RAPID DRUG SCREEN, URINE - Normal    Amph/Meth UR Negative  Negative Final    Barbiturate Ur Negative  Negative Final    Benzodiazepine Urine Negative  Negative Final    Cocaine Urine Negative  Negative Final    Methadone Urine Negative  Negative Final    Opiate Urine Negative  Negative Final    PCP Ur Negative  Negative Final    THC Urine Negative  Negative Final    Narrative:     FOR MEDICAL PURPOSES ONLY     IF CONFIRMATION NEEDED PLEASE CONTACT THE LAB WITHIN 5 DAYS  Drug Screen Cutoff Levels:  AMPHETAMINE/METHAMPHETAMINES  1000 ng/mL  BARBITURATES     200 ng/mL  BENZODIAZEPINES     200 ng/mL  COCAINE      300 ng/mL  METHADONE      300 ng/mL  OPIATES      300 ng/mL  PHENCYCLIDINE     25 ng/mL  THC       50 ng/mL   CBC AND DIFFERENTIAL - Normal    WBC 8 67  4 31 - 10 16 Thousand/uL Final    RBC 4 82  3 81 - 5 12 Million/uL Final    Hemoglobin 13 4  11 5 - 15 4 g/dL Final    Hematocrit 42 5  34 8 - 46 1 % Final    MCV 88  82 - 98 fL Final    MCH 27 8  26 8 - 34 3 pg Final    MCHC 31 5  31 4 - 37 4 g/dL Final    RDW 14 6  11 6 - 15 1 % Final    MPV 9 1  8 9 - 12 7 fL Final    Platelets 012  657 - 390 Thousands/uL Final    nRBC 0  /100 WBCs Final    Neutrophils Relative 71  43 - 75 % Final    Lymphocytes Relative 17  14 - 44 % Final    Monocytes Relative 5  4 - 12 % Final    Eosinophils Relative 6  0 - 6 % Final    Basophils Relative 1  0 - 1 % Final    Neutrophils Absolute 6 19  1 85 - 7 62 Thousands/µL Final    Lymphocytes Absolute 1 47  0 60 - 4 47 Thousands/µL Final    Monocytes Absolute 0 40  0 17 - 1 22 Thousand/µL Final    Eosinophils Absolute 0 55  0 00 - 0 61 Thousand/µL Final    Basophils Absolute 0 04  0 00 - 0 10 Thousands/µL Final   TSH, 3RD GENERATION - Normal    TSH 3RD GENERATON 2 520  0 358 - 3 740 uIU/mL Final    Narrative:     Patients undergoing fluorescein dye angiography may retain small amounts of fluorescein in the body for 48-72 hours post procedure  Samples containing fluorescein can produce falsely depressed TSH values  If the patient had this procedure,a specimen should be resubmitted post fluorescein clearance            The recommended reference ranges for TSH during pregnancy are as follows:  First trimester 0 1 to 2 5 uIU/mL  Second trimester  0 2 to 3 0 uIU/mL  Third trimester 0 3 to 3 0 uIU/m     POCT ALCOHOL BREATH TEST - Normal    EXTBreath Alcohol negative   Final   POCT URINALYSIS DIPSTICK - Normal    Color, UA see chart   Final URINE CULTURE

## 2018-04-11 LAB
ALBUMIN SERPL BCP-MCNC: 3.7 G/DL (ref 3.5–5)
ALP SERPL-CCNC: 126 U/L (ref 46–116)
ALT SERPL W P-5'-P-CCNC: 18 U/L (ref 12–78)
ANION GAP SERPL CALCULATED.3IONS-SCNC: 8 MMOL/L (ref 4–13)
AST SERPL W P-5'-P-CCNC: 16 U/L (ref 5–45)
BACTERIA UR CULT: ABNORMAL
BASOPHILS # BLD MANUAL: 0 THOUSAND/UL (ref 0–0.1)
BASOPHILS NFR MAR MANUAL: 0 % (ref 0–1)
BILIRUB SERPL-MCNC: 0.41 MG/DL (ref 0.2–1)
BUN SERPL-MCNC: 22 MG/DL (ref 5–25)
CALCIUM SERPL-MCNC: 9.4 MG/DL
CHLORIDE SERPL-SCNC: 106 MMOL/L (ref 100–108)
CHOLEST SERPL-MCNC: 187 MG/DL (ref 50–200)
CO2 SERPL-SCNC: 25 MMOL/L (ref 21–32)
CREAT SERPL-MCNC: 1.12 MG/DL (ref 0.6–1.3)
EOSINOPHIL # BLD MANUAL: 0.4 THOUSAND/UL (ref 0–0.4)
EOSINOPHIL NFR BLD MANUAL: 4 % (ref 0–6)
ERYTHROCYTE [DISTWIDTH] IN BLOOD BY AUTOMATED COUNT: 14.8 % (ref 11.6–15.1)
GFR SERPL CREATININE-BSD FRML MDRD: 55 ML/MIN/1.73SQ M
GLUCOSE P FAST SERPL-MCNC: 96 MG/DL (ref 65–99)
GLUCOSE SERPL-MCNC: 96 MG/DL (ref 65–140)
HCT VFR BLD AUTO: 47.3 % (ref 34.8–46.1)
HDLC SERPL-MCNC: 65 MG/DL (ref 40–60)
HGB BLD-MCNC: 15.1 G/DL (ref 11.5–15.4)
LDLC SERPL CALC-MCNC: 96 MG/DL (ref 0–100)
LYMPHOCYTES # BLD AUTO: 1.72 THOUSAND/UL (ref 0.6–4.47)
LYMPHOCYTES # BLD AUTO: 17 % (ref 14–44)
MCH RBC QN AUTO: 28.2 PG (ref 26.8–34.3)
MCHC RBC AUTO-ENTMCNC: 31.9 G/DL (ref 31.4–37.4)
MCV RBC AUTO: 88 FL (ref 82–98)
MONOCYTES # BLD AUTO: 0.4 THOUSAND/UL (ref 0–1.22)
MONOCYTES NFR BLD: 4 % (ref 4–12)
NEUTROPHILS # BLD MANUAL: 7.58 THOUSAND/UL (ref 1.85–7.62)
NEUTS SEG NFR BLD AUTO: 75 % (ref 43–75)
NONHDLC SERPL-MCNC: 122 MG/DL
NRBC BLD AUTO-RTO: 0 /100 WBCS
PLATELET # BLD AUTO: 194 THOUSANDS/UL (ref 149–390)
PLATELET BLD QL SMEAR: ADEQUATE
PMV BLD AUTO: 11 FL (ref 8.9–12.7)
POTASSIUM SERPL-SCNC: 4.5 MMOL/L (ref 3.5–5.3)
PROT SERPL-MCNC: 7.5 G/DL (ref 6.4–8.2)
RBC # BLD AUTO: 5.36 MILLION/UL (ref 3.81–5.12)
RBC MORPH BLD: NORMAL
SODIUM SERPL-SCNC: 139 MMOL/L (ref 136–145)
T3 SERPL-MCNC: 1 NG/ML (ref 0.6–1.8)
T4 SERPL-MCNC: 10.9 UG/DL (ref 4.7–13.3)
TRIGL SERPL-MCNC: 132 MG/DL
TSH SERPL DL<=0.05 MIU/L-ACNC: 3.76 UIU/ML (ref 0.36–3.74)
WBC # BLD AUTO: 10.11 THOUSAND/UL (ref 4.31–10.16)

## 2018-04-11 PROCEDURE — 86592 SYPHILIS TEST NON-TREP QUAL: CPT | Performed by: PSYCHIATRY & NEUROLOGY

## 2018-04-11 PROCEDURE — 80053 COMPREHEN METABOLIC PANEL: CPT | Performed by: PSYCHIATRY & NEUROLOGY

## 2018-04-11 PROCEDURE — 99223 1ST HOSP IP/OBS HIGH 75: CPT | Performed by: PSYCHIATRY & NEUROLOGY

## 2018-04-11 PROCEDURE — 85007 BL SMEAR W/DIFF WBC COUNT: CPT | Performed by: PSYCHIATRY & NEUROLOGY

## 2018-04-11 PROCEDURE — 85027 COMPLETE CBC AUTOMATED: CPT | Performed by: PSYCHIATRY & NEUROLOGY

## 2018-04-11 PROCEDURE — 84443 ASSAY THYROID STIM HORMONE: CPT | Performed by: PSYCHIATRY & NEUROLOGY

## 2018-04-11 PROCEDURE — 84480 ASSAY TRIIODOTHYRONINE (T3): CPT | Performed by: PSYCHIATRY & NEUROLOGY

## 2018-04-11 PROCEDURE — 84436 ASSAY OF TOTAL THYROXINE: CPT | Performed by: PSYCHIATRY & NEUROLOGY

## 2018-04-11 PROCEDURE — 80061 LIPID PANEL: CPT | Performed by: PSYCHIATRY & NEUROLOGY

## 2018-04-11 RX ORDER — ALBUTEROL SULFATE 90 UG/1
2 AEROSOL, METERED RESPIRATORY (INHALATION) EVERY 6 HOURS
Status: DISCONTINUED | OUTPATIENT
Start: 2018-04-11 | End: 2018-04-18 | Stop reason: HOSPADM

## 2018-04-11 RX ORDER — OXYBUTYNIN CHLORIDE 5 MG/1
20 TABLET, EXTENDED RELEASE ORAL DAILY
Status: DISCONTINUED | OUTPATIENT
Start: 2018-04-11 | End: 2018-04-18 | Stop reason: HOSPADM

## 2018-04-11 RX ORDER — TRAZODONE HYDROCHLORIDE 50 MG/1
50 TABLET ORAL
Status: DISCONTINUED | OUTPATIENT
Start: 2018-04-11 | End: 2018-04-18 | Stop reason: HOSPADM

## 2018-04-11 RX ORDER — OXYBUTYNIN CHLORIDE 5 MG/1
1 TABLET ORAL DAILY
Status: ON HOLD | COMMUNITY
End: 2018-04-11 | Stop reason: SDUPTHER

## 2018-04-11 RX ORDER — LORATADINE 10 MG/1
10 TABLET ORAL DAILY
Status: DISCONTINUED | OUTPATIENT
Start: 2018-04-11 | End: 2018-04-18 | Stop reason: HOSPADM

## 2018-04-11 RX ORDER — INDOMETHACIN 25 MG/1
1 CAPSULE ORAL 2 TIMES DAILY
COMMUNITY
Start: 2017-01-05 | End: 2021-01-01 | Stop reason: HOSPADM

## 2018-04-11 RX ORDER — ACETAMINOPHEN 325 MG/1
650 TABLET ORAL EVERY 6 HOURS PRN
Status: DISCONTINUED | OUTPATIENT
Start: 2018-04-11 | End: 2018-04-18 | Stop reason: HOSPADM

## 2018-04-11 RX ORDER — HYDROXYZINE HYDROCHLORIDE 25 MG/1
25 TABLET, FILM COATED ORAL EVERY 6 HOURS
Status: DISCONTINUED | OUTPATIENT
Start: 2018-04-11 | End: 2018-04-15

## 2018-04-11 RX ORDER — CLONAZEPAM 0.5 MG/1
0.5 TABLET ORAL
Status: DISCONTINUED | OUTPATIENT
Start: 2018-04-11 | End: 2018-04-18 | Stop reason: HOSPADM

## 2018-04-11 RX ORDER — LAMOTRIGINE 100 MG/1
1 TABLET ORAL DAILY
Status: ON HOLD | COMMUNITY
End: 2018-04-11 | Stop reason: SDUPTHER

## 2018-04-11 RX ORDER — FOLIC ACID 1 MG/1
1 TABLET ORAL DAILY
Status: DISCONTINUED | OUTPATIENT
Start: 2018-04-11 | End: 2018-04-18 | Stop reason: HOSPADM

## 2018-04-11 RX ORDER — FLUTICASONE PROPIONATE 50 MCG
2 SPRAY, SUSPENSION (ML) NASAL DAILY
Status: DISCONTINUED | OUTPATIENT
Start: 2018-04-11 | End: 2018-04-18 | Stop reason: HOSPADM

## 2018-04-11 RX ORDER — LAMOTRIGINE 100 MG/1
200 TABLET ORAL DAILY
Status: DISCONTINUED | OUTPATIENT
Start: 2018-04-11 | End: 2018-04-18 | Stop reason: HOSPADM

## 2018-04-11 RX ORDER — LORATADINE 10 MG/1
10 TABLET ORAL DAILY
Status: DISCONTINUED | OUTPATIENT
Start: 2018-04-11 | End: 2018-04-11

## 2018-04-11 RX ORDER — FLUTICASONE PROPIONATE 50 MCG
2 SPRAY, SUSPENSION (ML) NASAL DAILY
COMMUNITY
End: 2021-01-01 | Stop reason: HOSPADM

## 2018-04-11 RX ORDER — GABAPENTIN 300 MG/1
600 CAPSULE ORAL 3 TIMES DAILY
Status: DISCONTINUED | OUTPATIENT
Start: 2018-04-11 | End: 2018-04-18 | Stop reason: HOSPADM

## 2018-04-11 RX ORDER — DULOXETIN HYDROCHLORIDE 60 MG/1
120 CAPSULE, DELAYED RELEASE ORAL DAILY
Status: DISCONTINUED | OUTPATIENT
Start: 2018-04-11 | End: 2018-04-18 | Stop reason: HOSPADM

## 2018-04-11 RX ORDER — LOPERAMIDE HYDROCHLORIDE 2 MG/1
2 CAPSULE ORAL EVERY 4 HOURS PRN
Status: DISCONTINUED | OUTPATIENT
Start: 2018-04-11 | End: 2018-04-18 | Stop reason: HOSPADM

## 2018-04-11 RX ORDER — BISACODYL 10 MG
10 SUPPOSITORY, RECTAL RECTAL DAILY PRN
Status: DISCONTINUED | OUTPATIENT
Start: 2018-04-11 | End: 2018-04-18 | Stop reason: HOSPADM

## 2018-04-11 RX ORDER — AMLODIPINE BESYLATE 2.5 MG/1
2.5 TABLET ORAL DAILY
Status: DISCONTINUED | OUTPATIENT
Start: 2018-04-11 | End: 2018-04-18 | Stop reason: HOSPADM

## 2018-04-11 RX ORDER — PRAVASTATIN SODIUM 40 MG
40 TABLET ORAL
Status: DISCONTINUED | OUTPATIENT
Start: 2018-04-11 | End: 2018-04-18 | Stop reason: HOSPADM

## 2018-04-11 RX ORDER — ACETAMINOPHEN 325 MG/1
650 TABLET ORAL EVERY 4 HOURS PRN
Status: DISCONTINUED | OUTPATIENT
Start: 2018-04-11 | End: 2018-04-18 | Stop reason: HOSPADM

## 2018-04-11 RX ORDER — LEVOTHYROXINE SODIUM 0.12 MG/1
1 TABLET ORAL DAILY
Status: ON HOLD | COMMUNITY
End: 2018-04-11 | Stop reason: SDUPTHER

## 2018-04-11 RX ORDER — MELATONIN
5000
Status: DISCONTINUED | OUTPATIENT
Start: 2018-04-11 | End: 2018-04-18 | Stop reason: HOSPADM

## 2018-04-11 RX ORDER — PALIPERIDONE 3 MG/1
3 TABLET, EXTENDED RELEASE ORAL DAILY
Status: DISCONTINUED | OUTPATIENT
Start: 2018-04-12 | End: 2018-04-13

## 2018-04-11 RX ORDER — DULOXETIN HYDROCHLORIDE 30 MG/1
1 CAPSULE, DELAYED RELEASE ORAL DAILY
Status: ON HOLD | COMMUNITY
End: 2018-04-11

## 2018-04-11 RX ORDER — LORATADINE 10 MG/1
10 TABLET ORAL DAILY
COMMUNITY
End: 2021-01-01 | Stop reason: HOSPADM

## 2018-04-11 RX ORDER — LANOLIN ALCOHOL/MO/W.PET/CERES
5 CREAM (GRAM) TOPICAL
Status: DISCONTINUED | OUTPATIENT
Start: 2018-04-11 | End: 2018-04-18 | Stop reason: HOSPADM

## 2018-04-11 RX ORDER — CLONAZEPAM 0.5 MG/1
0.75 TABLET ORAL
Status: DISCONTINUED | OUTPATIENT
Start: 2018-04-11 | End: 2018-04-18 | Stop reason: HOSPADM

## 2018-04-11 RX ORDER — ZOLPIDEM TARTRATE 5 MG/1
1 TABLET ORAL
Status: ON HOLD | COMMUNITY
End: 2018-04-11 | Stop reason: ALTCHOICE

## 2018-04-11 RX ORDER — ACETAMINOPHEN 325 MG/1
975 TABLET ORAL EVERY 6 HOURS PRN
Status: DISCONTINUED | OUTPATIENT
Start: 2018-04-11 | End: 2018-04-18 | Stop reason: HOSPADM

## 2018-04-11 RX ADMIN — LAMOTRIGINE 200 MG: 100 TABLET ORAL at 11:39

## 2018-04-11 RX ADMIN — DULOXETINE HYDROCHLORIDE 120 MG: 60 CAPSULE, DELAYED RELEASE ORAL at 11:40

## 2018-04-11 RX ADMIN — AMLODIPINE BESYLATE 2.5 MG: 2.5 TABLET ORAL at 11:40

## 2018-04-11 RX ADMIN — PRAVASTATIN SODIUM 40 MG: 40 TABLET ORAL at 17:08

## 2018-04-11 RX ADMIN — FLUTICASONE PROPIONATE AND SALMETEROL 1 PUFF: 50; 250 POWDER RESPIRATORY (INHALATION) at 17:10

## 2018-04-11 RX ADMIN — HYDROXYZINE HYDROCHLORIDE 25 MG: 25 TABLET, FILM COATED ORAL at 17:08

## 2018-04-11 RX ADMIN — VITAMIN D, TAB 1000IU (100/BT) 5000 UNITS: 25 TAB at 21:33

## 2018-04-11 RX ADMIN — LURASIDONE HYDROCHLORIDE 200 MG: 80 TABLET, FILM COATED ORAL at 22:30

## 2018-04-11 RX ADMIN — CLONAZEPAM 0.75 MG: 0.5 TABLET ORAL at 21:33

## 2018-04-11 RX ADMIN — LORATADINE 10 MG: 10 TABLET ORAL at 11:40

## 2018-04-11 RX ADMIN — MELATONIN TAB 3 MG 4.5 MG: 3 TAB at 21:34

## 2018-04-11 RX ADMIN — ALBUTEROL SULFATE 2 PUFF: 90 AEROSOL, METERED RESPIRATORY (INHALATION) at 17:10

## 2018-04-11 RX ADMIN — GABAPENTIN 600 MG: 300 CAPSULE ORAL at 21:33

## 2018-04-11 RX ADMIN — HYDROXYZINE HYDROCHLORIDE 25 MG: 25 TABLET, FILM COATED ORAL at 11:40

## 2018-04-11 RX ADMIN — TRAZODONE HYDROCHLORIDE 50 MG: 50 TABLET ORAL at 21:34

## 2018-04-11 RX ADMIN — FOLIC ACID 1 MG: 1 TABLET ORAL at 11:40

## 2018-04-11 RX ADMIN — GABAPENTIN 600 MG: 300 CAPSULE ORAL at 17:08

## 2018-04-11 RX ADMIN — HYDROXYZINE HYDROCHLORIDE 25 MG: 25 TABLET, FILM COATED ORAL at 21:34

## 2018-04-11 RX ADMIN — CLONAZEPAM 0.75 MG: 0.5 TABLET ORAL at 11:38

## 2018-04-11 RX ADMIN — GABAPENTIN 600 MG: 300 CAPSULE ORAL at 11:38

## 2018-04-11 NOTE — CONSULTS
Consult for Medical Clearance  Jean Suarez 63 y o  female MRN: [id number]  Unit/Bed#: [facility]1 684-34 Encounter: [id number]    History of Present Illness     HPI:  Jean Suarez is a 63 y o  female who presents with depression/suicidal ideations, as well as, hallucinations  She denies any new medical c/o  Lola Dalton Review of Systems   Constitutional: Negative  HENT: Negative  Eyes: Negative  Respiratory: Negative  Cardiovascular: Negative  Gastrointestinal: Negative  Endocrine: Negative  Genitourinary: Negative  Musculoskeletal: Negative  Skin: Negative  Allergic/Immunologic: Negative  Neurological: Negative  Hematological: Negative  Psychiatric/Behavioral: Positive for behavioral problems, confusion, decreased concentration, dysphoric mood, hallucinations and suicidal ideas  The patient is nervous/anxious  Historical Information   Past Medical History:   Diagnosis Date    Allergic rhinitis     Anxiety     Asthma     Bipolar 2 disorder (HCC)     Chronic left shoulder pain     Depression     Dizziness     Dizziness and giddiness     Gait abnormality     Hyperlipidemia     Hypertension     Hypothyroidism     Insomnia     Osteoarthritis     Overactive bladder     [facility]izophrenia ([facility] [address] )     [facility] ([facility] )      No past surgical history on file  Social History   History   Alcohol Use No     History   Drug Use No     History   Smoking Status    Never Smoker   Smokeless Tobacco    Never Used     Family History: History reviewed  No pertinent family history      Meds/Allergies   Current Facility-Administered Medications   Medication Dose Route Frequency    aluminum-magnesium hydroxide-simethicone (MYLANTA) 200-200-20 mg/5 mL oral suspension 30 mL  30 mL Oral Q4H PRN    benztropine (COGENTIN) injection 1 mg  1 mg Intramuscular Q1H PRN    benztropine (COGENTIN) tablet 1 mg  1 mg Oral Q1H PRN    haloperidol (HALDOL) tablet 5 mg  5 mg Oral Q6H PRN    haloperidol lactate (HALDOL) injection 5 mg  5 mg Intramuscular Q6H PRN    LORazepam (ATIVAN) 2 mg/mL injection 2 mg  2 mg Intramuscular Q4H PRN    LORazepam (ATIVAN) tablet 1 mg  1 mg Oral Q6H PRN    magnesium hydroxide (MILK OF MAGNESIA) 400 mg/5 mL oral suspension 30 mL  30 mL Oral Daily PRN    OLANZapine (ZyPREXA) tablet 5 mg  5 mg Oral Q3H PRN    risperiDONE (RisperDAL M-TABS) dispersible tablet 1 mg  1 mg Oral Q3H PRN    traZODone (DESYREL) tablet 50 mg  50 mg Oral HS PRN    ziprasidone (GEODON) IM injection 20 mg  20 mg Intramuscular Q4H PRN     Prescriptions Prior to Admission   Medication    albuterol (PROVENTIL HFA,VENTOLIN HFA) 90 mcg/act inhaler    amLODIPine (NORVASC) 2 5 mg tablet    bisacodyl (BISAC-EVAC) 10 mg suppository    celecoxib (CELEBREX) 200 mg capsule    cetirizine (ZyrTEC) 10 mg tablet    Cholecalciferol (VITAMIN D3) 12614 units TABS    clonazePAM (KlonoPIN) 0 25 MG disintegrating tablet    clonazePAM (KlonoPIN) 0 5 mg tablet    clonazePAM (KlonoPIN) 0 5 mg tablet    DULoxetine (CYMBALTA) 60 mg delayed release capsule    fluticasone (FLONASE) 50 mcg/act nasal spray    fluticasone furoate-vilanterol (BREO ELLIPTA)    folic acid (FOLVITE) 1 mg tablet    gabapentin (NEURONTIN) 600 MG tablet    hydrOXYzine HCL (ATARAX) 25 mg tablet    lamoTRIgine (LaMICtal) 200 MG tablet    levothyroxine 137 mcg tablet    lurasidone (LATUDA) 80 mg tablet    magnesium hydroxide (MILK OF MAGNESIA) 400 mg/5 mL oral suspension    Melatonin 5 MG TABS    oxybutynin (DITROPAN-XL) 5 mg 24 hr tablet    pravastatin (PRAVACHOL) 40 mg tablet    traZODone (DESYREL) 50 mg tablet     Allergies   Allergen Reactions    Morphine Shortness Of Breath    Guaifenesin     Iodine      Other reaction(s):  Other (See Comments)  contrast dye    Lexapro [Escitalopram] Hives    Other Other (See Comments)     IV DYE    Tetracycline Other (See Comments)     hives    Tylenol With Codeine #3 [Acetaminophen-Codeine] Other (See Comments)     hives    Penicillins Rash       Objective     BP (!) 172/96 (BP Location: Right arm)   Pulse 93   Temp 98 1 °F (36 7 °C) (Tympanic)   Resp 18   Ht 5' 4" (1 626 m)   Wt (!) 141 kg (311 lb 1 6 oz)   SpO2 95%   BMI 53 40 kg/m²     Current Vitals:   Blood Pressure: (!) 172/96 (04/10/18 1838)  Pulse: 93 (04/10/18 1838)  Temperature: 98 1 °F (36 7 °C) (04/10/18 1838)  Temp Source: Tympanic (04/10/18 1838)  Respirations: 18 (04/10/18 1838)  Height: 5' 4" (162 6 cm) (04/10/18 1838)  Weight - Scale: (!) 141 kg (311 lb 1 6 oz) (04/10/18 1838)  SpO2: 95 % (04/10/18 1838)    No intake or output data in the 24 hours ending 04/10/18 2141    Invasive Devices          No matching active lines, drains, or airways          Physical Exam   Constitutional: She is oriented to person, place, and time  She appears well-developed and well-nourished  No distress  HENT:   Head: Normocephalic and atraumatic  Right Ear: External ear normal    Left Ear: External ear normal    Nose: Nose normal    Mouth/Throat: Oropharynx is clear and moist    Eyes: Conjunctivae and EOM are normal  Pupils are equal, round, and reactive to light  Neck: Normal range of motion  Neck supple  No JVD present  No tracheal deviation present  No thyromegaly present  Cardiovascular: Normal rate, regular rhythm, normal heart sounds and intact distal pulses  Exam reveals no gallop and no friction rub  No murmur heard  Pulmonary/Chest: Effort normal and breath sounds normal  No stridor  No respiratory distress  She has no wheezes  She has no rales  She exhibits no tenderness  Abdominal: Soft  Bowel sounds are normal  She exhibits no distension and no mass  There is no tenderness  There is no rebound and no guarding  Genitourinary:   Genitourinary Comments: Deferred    Musculoskeletal: Normal range of motion  She exhibits no edema, tenderness or deformity     Lymphadenopathy:     She has no cervical adenopathy  Neurological: She is alert and oriented to person, place, and time  She has normal reflexes  She displays normal reflexes  No cranial nerve deficit  She exhibits normal muscle tone  Coordination normal    Skin: Skin is warm and dry  No rash noted  She is not diaphoretic  No erythema  No pallor  Psychiatric: Her mood appears anxious  Her affect is blunt  Her speech is delayed and slurred  She is slowed and withdrawn  She expresses impulsivity  She exhibits a depressed mood  She expresses suicidal ideation  She expresses suicidal plans  She exhibits abnormal recent memory           Lab Results:   Admission on 04/10/2018   Component Date Value    Amph/Meth UR 04/10/2018 Negative     Barbiturate Ur 04/10/2018 Negative     Benzodiazepine Urine 04/10/2018 Negative     Cocaine Urine 04/10/2018 Negative     Methadone Urine 04/10/2018 Negative     Opiate Urine 04/10/2018 Negative     PCP Ur 04/10/2018 Negative     THC Urine 04/10/2018 Negative     EXTBreath Alcohol 04/10/2018 negative     Sodium 04/10/2018 139     Potassium 04/10/2018 4 9     Chloride 04/10/2018 104     CO2 04/10/2018 31     Anion Gap 04/10/2018 4     BUN 04/10/2018 25     Creatinine 04/10/2018 1 35*    Glucose 04/10/2018 130     Calcium 04/10/2018 9 2     AST 04/10/2018 12     ALT 04/10/2018 16     Alkaline Phosphatase 04/10/2018 125*    Total Protein 04/10/2018 7 1     Albumin 04/10/2018 3 7     Total Bilirubin 04/10/2018 0 38     eGFR 04/10/2018 44     WBC 04/10/2018 8 67     RBC 04/10/2018 4 82     Hemoglobin 04/10/2018 13 4     Hematocrit 04/10/2018 42 5     MCV 04/10/2018 88     MCH 04/10/2018 27 8     MCHC 04/10/2018 31 5     RDW 04/10/2018 14 6     MPV 04/10/2018 9 1     Platelets 71/03/8296 177     nRBC 04/10/2018 0     Neutrophils Relative 04/10/2018 71     Lymphocytes Relative 04/10/2018 17     Monocytes Relative 04/10/2018 5     Eosinophils Relative 04/10/2018 6     Basophils Relative 04/10/2018 1     Neutrophils Absolute 04/10/2018 6 19     Lymphocytes Absolute 04/10/2018 1 47     Monocytes Absolute 04/10/2018 0 40     Eosinophils Absolute 04/10/2018 0 55     Basophils Absolute 04/10/2018 0 04     TSH 3RD GENERATON 04/10/2018 2 520     Color, UA 04/10/2018 see chart     Color, UA 04/10/2018 Yellow     Clarity, UA 04/10/2018 Clear     pH, UA 04/10/2018 7 0     Leukocytes, UA 04/10/2018 Small*    Nitrite, UA 04/10/2018 Negative     Protein, UA 04/10/2018 Negative     Glucose, UA 04/10/2018 Negative     Ketones, UA 04/10/2018 Negative     Urobilinogen, UA 04/10/2018 0 2     Bilirubin, UA 04/10/2018 Negative     Blood, UA 04/10/2018 Negative     Specific Gravity, UA 04/10/2018 1 015     RBC, UA 04/10/2018 None Seen     WBC, UA 04/10/2018 20-30*    Epithelial Cells 04/10/2018 None Seen     Bacteria, UA 04/10/2018 Occasional     Hyaline Casts, UA 04/10/2018 None Seen     Ventricular Rate 04/10/2018 71     Atrial Rate 04/10/2018 71     NJ Interval 04/10/2018 222     QRSD Interval 04/10/2018 88     QT Interval 04/10/2018 388     QTC Interval 04/10/2018 421     P Axis 04/10/2018 50     QRS Axis 04/10/2018 22     T Wave Axis 04/10/2018 51        EKG, Pathology, and Other Studies: I have personally reviewed pertinent reports  Assessment/Plan     Assessment:  1  Depression/suicidal ideations with hallucinations  2  HTN- controlled by meds  3  Asthma  4  Hyperlipidemia  5  Hypothyroidism  Plan:  1  Admit for Psych eval/treatment  2  Cont  BP meds  3  Cont  Asthma meds  4  Cont  Statins  5  Cont  Synthroid  Counseling / Coordination of Care  Total floor / unit time spent today 1 hour  Greater than 50% of total time was spent with the patient and / or family counseling and / or coordination of care        Rebecca Lester PA-C  4/10/2018
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
## 2018-04-11 NOTE — TREATMENT PLAN
TREATMENT PLAN REVIEW - [address] 63 y o  1960 female MRN: [id number]    [address] Room / Bed: [place] [address] [id number] Encounter: [id number]          Admit Date/Time:  4/10/2018  1:42 PM    Treatment Team: Attending Provider: Miller Berg MD; Patient Care Technician: Adalberto Hoffman; Patient Care Technician: Agapito Zeng; : Ayanna Perdomo; Occupational Therapy Assistant: Val Santa; Registered Nurse: Cindy Cha, RN; Registered Nurse: Eliza Beltran RN    Diagnosis: Principal Problem:    Schizoaffective disorder, bipolar type ([facility] )  Active Problems:    Encounter for examination and observation for other specified reasons      Patient Strengths/Assets: cooperative, compliant with medication, patient is on a voluntary commitment    Patient Barriers/Limitations: poor insight, poor past treatment response    Short Term Goals: decrease in depressive symptoms, decrease in psychotic symptoms, decrease in suicidal thoughts, ability to stay safe on the unit    Long Term Goals: improvement in depression, free of suicidal thoughts, resolution of psychotic symptoms, improved insight    Progress Towards Goals: starting psychiatric medications as prescribed    Recommended Treatment: medication management, patient medication education, group therapy, milieu therapy, continued Behavioral Health psychiatric evaluation/assessment process    Treatment Frequency: daily medication monitoring, group and milieu therapy daily, monitoring through interdisciplinary rounds, monitoring through weekly patient care conferences    Expected Discharge Date:   In 5 days     Discharge Plan: referral for outpatient medication management with a psychiatrist, referrals as indicated, return to previous placement     Treatment Plan Created/Updated By: Miller Berg MD
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
## 2018-04-11 NOTE — PLAN OF CARE
Problem: DISCHARGE PLANNING  Goal: Discharge to home or other facility with appropriate resources  INTERVENTIONS:  - Identify barriers to discharge w/patient and caregiver  - Arrange for needed discharge resources and transportation as appropriate  - Identify discharge learning needs (meds, wound care, etc )  - Arrange for interpretive services to assist at discharge as needed  - Refer to Case Management Department for coordinating discharge planning if the patient needs post-hospital services based on physician/advanced practitioner order or complex needs related to functional status, cognitive ability, or social support system   Outcome: Progressing  SW intake completed, Tx plan reviewed with and signed by Pt and Release of Information obtained for children (Daughter Kimberly Huff) and [facility]  [address]  [facility] for coordination of care and discharge planning purposes
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
## 2018-04-11 NOTE — PROGRESS NOTES
Pt is cooperative with care and is medication compliant  Pt is present in the dayroom advises she is "feeling nervous due to being in a new place"  Pt denies si/hi  Pt is in agreement to seek staff for  or assistance  Will continue to monitor

## 2018-04-11 NOTE — PLAN OF CARE
Problem: Ineffective Coping  Goal: Participates in unit activities  Interventions:  - Provide therapeutic environment   - Provide required programming   - Redirect inappropriate behaviors    Outcome: Progressing  Pt  Spontaneously engaged in groups without prompting  She stated a desire to lessen the voices in her head, previously telling her to harm herself  Pt was able to functionally focus to group tasks and did not appear to attend to voices while in structured activities

## 2018-04-11 NOTE — CMS CERTIFICATION NOTE
Certification: Based upon physical, mental and social evaluations, I certify that inpatient psychiatric services are medically necessary for this patient for a duration of 5 midnights for the treatment of Schizoaffective Disorder Bipolar Type   Available alternative community resources do not meet the patient's mental health care needs  I further attest that an established written individualized plan of care has been implemented and is outlined in the patient's medical records

## 2018-04-11 NOTE — DISCHARGE INSTR - OTHER ORDERS
[address] [facility] 24 Hour Crisis Hotline  Jerry Rodriguez 930:  a telephone support service for [place], [place] adults challenged by loneliness, confusion and other mental health concerns, or who need information about available services  Workers answering the phone are not counselors, but are kind, caring and compassionate people, trained in active listening, referral support and other skills that allow them to assist callers  The Warmline accepts calls between 6:00 AM to 10:00 AM and from 4:00 PM to 12:00 AM, seven days a week  Because we have a small staff working during those times, your call might go to voice mail  If this happens, please leave us your first name and your phone number and we will return your call within one hour  [facility] residents (only, please!) can reach the [place] by calling [phone]  [facility]  [address] [facility]  [phone]  [place]: this is a telephone support line  M-F 8:30 AM  4:30 PM     [id number]  ([phone])    [address] [facility]  Call 24/7  [phone]: ([phone])    [facility] and Referral Line: Professional caseworkers will provide information about human services in the community and can make formal referrals to and appropriate agency   [phone]   M-F 8:30 AM  4:15 PM  [address] [facility] and Referral Line: An information referral specialist will direct you to where your needs can best be met     [phone]    M-F 8:00 AM  5:00 PM    KELIN Rivera 40 is a free, confidential, non-emergency, comprehensive information and referral service that connects Tani Ghotra Harriman, [place], Trey Martinez, and Mihai Barron residents with the health and human services they need   Dial 211 on your telephone 24/7                       Marlon Khalil

## 2018-04-12 LAB — RPR SER QL: NORMAL

## 2018-04-12 PROCEDURE — 99233 SBSQ HOSP IP/OBS HIGH 50: CPT | Performed by: PSYCHIATRY & NEUROLOGY

## 2018-04-12 RX ORDER — OXYCODONE HYDROCHLORIDE AND ACETAMINOPHEN 5; 325 MG/1; MG/1
1 TABLET ORAL EVERY 8 HOURS PRN
Status: DISCONTINUED | OUTPATIENT
Start: 2018-04-12 | End: 2018-04-18 | Stop reason: HOSPADM

## 2018-04-12 RX ORDER — LOXAPINE SUCCINATE 25 MG/1
25 TABLET ORAL
Status: DISCONTINUED | OUTPATIENT
Start: 2018-04-12 | End: 2018-04-13

## 2018-04-12 RX ADMIN — ALBUTEROL SULFATE 2 PUFF: 90 AEROSOL, METERED RESPIRATORY (INHALATION) at 06:16

## 2018-04-12 RX ADMIN — ALBUTEROL SULFATE 2 PUFF: 90 AEROSOL, METERED RESPIRATORY (INHALATION) at 17:00

## 2018-04-12 RX ADMIN — DULOXETINE HYDROCHLORIDE 120 MG: 60 CAPSULE, DELAYED RELEASE ORAL at 08:30

## 2018-04-12 RX ADMIN — OXYCODONE HYDROCHLORIDE AND ACETAMINOPHEN 1 TABLET: 5; 325 TABLET ORAL at 17:38

## 2018-04-12 RX ADMIN — HYDROXYZINE HYDROCHLORIDE 25 MG: 25 TABLET, FILM COATED ORAL at 16:57

## 2018-04-12 RX ADMIN — PALIPERIDONE 3 MG: 3 TABLET, FILM COATED, EXTENDED RELEASE ORAL at 08:35

## 2018-04-12 RX ADMIN — LAMOTRIGINE 200 MG: 100 TABLET ORAL at 08:30

## 2018-04-12 RX ADMIN — GABAPENTIN 600 MG: 300 CAPSULE ORAL at 16:55

## 2018-04-12 RX ADMIN — HYDROXYZINE HYDROCHLORIDE 25 MG: 25 TABLET, FILM COATED ORAL at 06:15

## 2018-04-12 RX ADMIN — OXYBUTYNIN CHLORIDE 20 MG: 5 TABLET, FILM COATED, EXTENDED RELEASE ORAL at 08:30

## 2018-04-12 RX ADMIN — CLONAZEPAM 0.75 MG: 0.5 TABLET ORAL at 11:48

## 2018-04-12 RX ADMIN — TRAZODONE HYDROCHLORIDE 50 MG: 50 TABLET ORAL at 21:05

## 2018-04-12 RX ADMIN — MELATONIN TAB 3 MG 4.5 MG: 3 TAB at 21:05

## 2018-04-12 RX ADMIN — CLONAZEPAM 0.75 MG: 0.5 TABLET ORAL at 21:03

## 2018-04-12 RX ADMIN — LOXAPINE 25 MG: 25 CAPSULE ORAL at 21:05

## 2018-04-12 RX ADMIN — LORATADINE 10 MG: 10 TABLET ORAL at 08:31

## 2018-04-12 RX ADMIN — FOLIC ACID 1 MG: 1 TABLET ORAL at 08:31

## 2018-04-12 RX ADMIN — GABAPENTIN 600 MG: 300 CAPSULE ORAL at 21:05

## 2018-04-12 RX ADMIN — HYDROXYZINE HYDROCHLORIDE 25 MG: 25 TABLET, FILM COATED ORAL at 22:03

## 2018-04-12 RX ADMIN — PRAVASTATIN SODIUM 40 MG: 40 TABLET ORAL at 16:57

## 2018-04-12 RX ADMIN — CLONAZEPAM 0.5 MG: 0.5 TABLET ORAL at 06:15

## 2018-04-12 RX ADMIN — FLUTICASONE PROPIONATE AND SALMETEROL 1 PUFF: 50; 250 POWDER RESPIRATORY (INHALATION) at 09:27

## 2018-04-12 RX ADMIN — GABAPENTIN 600 MG: 300 CAPSULE ORAL at 08:31

## 2018-04-12 RX ADMIN — FLUTICASONE PROPIONATE AND SALMETEROL 1 PUFF: 50; 250 POWDER RESPIRATORY (INHALATION) at 17:00

## 2018-04-12 RX ADMIN — LEVOTHYROXINE SODIUM 137 MCG: 112 TABLET ORAL at 06:15

## 2018-04-12 RX ADMIN — FLUTICASONE PROPIONATE 2 SPRAY: 50 SPRAY, METERED NASAL at 09:27

## 2018-04-12 RX ADMIN — ALBUTEROL SULFATE 2 PUFF: 90 AEROSOL, METERED RESPIRATORY (INHALATION) at 11:52

## 2018-04-12 RX ADMIN — HYDROXYZINE HYDROCHLORIDE 25 MG: 25 TABLET, FILM COATED ORAL at 11:50

## 2018-04-12 RX ADMIN — VITAMIN D, TAB 1000IU (100/BT) 5000 UNITS: 25 TAB at 21:04

## 2018-04-12 RX ADMIN — LOPERAMIDE HYDROCHLORIDE 2 MG: 2 CAPSULE ORAL at 09:25

## 2018-04-12 RX ADMIN — LURASIDONE HYDROCHLORIDE 120 MG: 80 TABLET, FILM COATED ORAL at 16:55

## 2018-04-12 RX ADMIN — AMLODIPINE BESYLATE 2.5 MG: 2.5 TABLET ORAL at 08:31

## 2018-04-12 RX ADMIN — ALBUTEROL SULFATE 2 PUFF: 90 AEROSOL, METERED RESPIRATORY (INHALATION) at 22:02

## 2018-04-12 NOTE — PROGRESS NOTES
Pt calm, pleasant and med compliant  Visible in the milieu  Pt approached nurses stations x3 asking about her percocet  Pt was told there is no order for percocet at this time  Pt denies any s/s  Monitoring

## 2018-04-12 NOTE — PLAN OF CARE
Problem: Ineffective Coping  Goal: Participates in unit activities  Interventions:  - Provide therapeutic environment   - Provide required programming   - Redirect inappropriate behaviors    Outcome: Not Progressing  Pt  Attempted to join morning groups but retreated back to her bathroom due to loose bowels

## 2018-04-12 NOTE — PROGRESS NOTES
Pt is cooperative with care and is medication compliant  Pt currently denies SI   Pt makes her needs known, advises that she is "going to stay in her room because she is not feeling well and is experiencing diarrhea"  Will continue to monitor
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
## 2018-04-12 NOTE — PLAN OF CARE
Anxiety     Anxiety is at manageable level Progressing        Depression     Treatment Goal: Demonstrate behavioral control of depressive symptoms, verbalize feelings of improved mood/affect, and adopt new coping skills prior to discharge Progressing        Risk for Self Injury/Neglect     Treatment Goal: Remain safe during length of stay, learn and adopt new coping skills, and be free of self-injurious ideation, impulses and acts at the time of discharge Progressing

## 2018-04-12 NOTE — PROGRESS NOTES
Progress Note - Behavioral Health   Joe Spencer 63 y o  female MRN: [id number]  Unit/Bed#: NC3 558-01 Encounter: [id number]    Patient seen, chart reviewed, discussed with staff  Nursing staff notes the patient has been calm and cooperative  She attended groups yesterday and slept well  Patient has complaints of back pain with asking for p r n  Percocet  Patient had been on this at [facility] prior to admission  On approach this morning the patient was seen in her room  The patient is cooperative with questions  She states that her anxiety is improving  The patient does continue with auditory hallucinations and states that she is hearing command voices to kill herself with a knife  The patient denies any suicidal ideation and contracts for safety here at the hospital   She states the voices continue to be disruptive and she also has visual hallucinations of a man and woman  Medications were reviewed with patient she denies any adverse effects      Behavior over the last 24 hours:  unchanged  Sleep: normal  Appetite: normal  Medication side effects: No  ROS: Back pain  /61 (BP Location: Left arm)   Pulse 67   Temp (!) 97 4 °F (36 3 °C) (Tympanic)   Resp 16   Ht 5' 4" (1 626 m)   Wt (!) 141 kg (311 lb 1 6 oz)   SpO2 98%   BMI 53 40 kg/m²     Medications:   Current Facility-Administered Medications   Medication Dose Route Frequency    acetaminophen (TYLENOL) tablet 650 mg  650 mg Oral Q6H PRN    acetaminophen (TYLENOL) tablet 650 mg  650 mg Oral Q4H PRN    acetaminophen (TYLENOL) tablet 975 mg  975 mg Oral Q6H PRN    albuterol (PROVENTIL HFA,VENTOLIN HFA) inhaler 2 puff  2 puff Inhalation Q6H    aluminum-magnesium hydroxide-simethicone (MYLANTA) 200-200-20 mg/5 mL oral suspension 30 mL  30 mL Oral Q4H PRN    amLODIPine (NORVASC) tablet 2 5 mg  2 5 mg Oral Daily    benztropine (COGENTIN) injection 1 mg  1 mg Intramuscular Q1H PRN    benztropine (COGENTIN) tablet 1 mg  1 mg Oral Q1H PRN    bisacodyl (DULCOLAX) rectal suppository 10 mg  10 mg Rectal Daily PRN    cholecalciferol (VITAMIN D3) tablet 5,000 Units  5,000 Units Oral HS    clonazePAM (KlonoPIN) tablet 0 5 mg  0 5 mg Oral Early Morning    clonazePAM (KlonoPIN) tablet 0 75 mg  0 75 mg Oral HS    clonazePAM (KlonoPIN) tablet 0 75 mg  0 75 mg Oral Daily With Lunch    DULoxetine (CYMBALTA) delayed release capsule 120 mg  120 mg Oral Daily    fluticasone (FLONASE) 50 mcg/act nasal spray 2 spray  2 spray Nasal Daily    fluticasone-salmeterol (ADVAIR) 250-50 mcg/dose inhaler 1 puff  1 puff Inhalation BID    folic acid (FOLVITE) tablet 1 mg  1 mg Oral Daily    gabapentin (NEURONTIN) capsule 600 mg  600 mg Oral TID    haloperidol (HALDOL) tablet 5 mg  5 mg Oral Q6H PRN    haloperidol lactate (HALDOL) injection 5 mg  5 mg Intramuscular Q6H PRN    hydrOXYzine HCL (ATARAX) tablet 25 mg  25 mg Oral Q6H    lamoTRIgine (LaMICtal) tablet 200 mg  200 mg Oral Daily    levothyroxine tablet 137 mcg  137 mcg Oral Early Morning    loperamide (IMODIUM) capsule 2 mg  2 mg Oral Q4H PRN    loratadine (CLARITIN) tablet 10 mg  10 mg Oral Daily    LORazepam (ATIVAN) 2 mg/mL injection 2 mg  2 mg Intramuscular Q4H PRN    LORazepam (ATIVAN) tablet 1 mg  1 mg Oral Q6H PRN    loxapine (LOXITANE) capsule 25 mg  25 mg Oral HS    lurasidone (LATUDA) tablet 120 mg  120 mg Oral Daily With Dinner    magnesium hydroxide (MILK OF MAGNESIA) 400 mg/5 mL oral suspension 30 mL  30 mL Oral Daily PRN    melatonin tablet 4 5 mg  4 5 mg Oral HS    OLANZapine (ZyPREXA) tablet 5 mg  5 mg Oral Q3H PRN    oxybutynin (DITROPAN-XL) 24 hr tablet 20 mg  20 mg Oral Daily    oxyCODONE-acetaminophen (PERCOCET) 5-325 mg per tablet 1 tablet  1 tablet Oral Q8H PRN    paliperidone (INVEGA) 24 hr tablet 3 mg  3 mg Oral Daily    pravastatin (PRAVACHOL) tablet 40 mg  40 mg Oral Daily With Dinner    risperiDONE (RisperDAL M-TABS) dispersible tablet 1 mg  1 mg Oral Q3H PRN    traZODone (DESYREL) tablet 50 mg  50 mg Oral HS PRN    traZODone (DESYREL) tablet 50 mg  50 mg Oral HS    ziprasidone (GEODON) IM injection 20 mg  20 mg Intramuscular Q4H PRN       Labs:   Admission on 04/10/2018   Component Date Value Ref Range Status    Amph/Meth UR 04/10/2018 Negative  Negative Final    Barbiturate Ur 04/10/2018 Negative  Negative Final    Benzodiazepine Urine 04/10/2018 Negative  Negative Final    Cocaine Urine 04/10/2018 Negative  Negative Final    Methadone Urine 04/10/2018 Negative  Negative Final    Opiate Urine 04/10/2018 Negative  Negative Final    PCP Ur 04/10/2018 Negative  Negative Final    THC Urine 04/10/2018 Negative  Negative Final    EXTBreath Alcohol 04/10/2018 negative   Final    Sodium 04/10/2018 139  136 - 145 mmol/L Final    Potassium 04/10/2018 4 9  3 5 - 5 3 mmol/L Final    Chloride 04/10/2018 104  100 - 108 mmol/L Final    CO2 04/10/2018 31  21 - 32 mmol/L Final    Anion Gap 04/10/2018 4  4 - 13 mmol/L Final    BUN 04/10/2018 25  5 - 25 mg/dL Final    Creatinine 04/10/2018 1 35* 0 60 - 1 30 mg/dL Final    Glucose 04/10/2018 130  65 - 140 mg/dL Final    Calcium 04/10/2018 9 2  mg/dL Final    AST 04/10/2018 12  5 - 45 U/L Final    ALT 04/10/2018 16  12 - 78 U/L Final    Alkaline Phosphatase 04/10/2018 125* 46 - 116 U/L Final    Total Protein 04/10/2018 7 1  6 4 - 8 2 g/dL Final    Albumin 04/10/2018 3 7  3 5 - 5 0 g/dL Final    Total Bilirubin 04/10/2018 0 38  0 20 - 1 00 mg/dL Final    eGFR 04/10/2018 44  ml/min/1 73sq m Final    WBC 04/10/2018 8 67  4 31 - 10 16 Thousand/uL Final    RBC 04/10/2018 4 82  3 81 - 5 12 Million/uL Final    Hemoglobin 04/10/2018 13 4  11 5 - 15 4 g/dL Final    Hematocrit 04/10/2018 42 5  34 8 - 46 1 % Final    MCV 04/10/2018 88  82 - 98 fL Final    MCH 04/10/2018 27 8  26 8 - 34 3 pg Final    MCHC 04/10/2018 31 5  31 4 - 37 4 g/dL Final    RDW 04/10/2018 14 6  11 6 - 15 1 % Final    MPV 04/10/2018 9 1  8 9 - 12 7 fL Final    Platelets 66/08/3736 177  149 - 390 Thousands/uL Final    nRBC 04/10/2018 0  /100 WBCs Final    Neutrophils Relative 04/10/2018 71  43 - 75 % Final    Lymphocytes Relative 04/10/2018 17  14 - 44 % Final    Monocytes Relative 04/10/2018 5  4 - 12 % Final    Eosinophils Relative 04/10/2018 6  0 - 6 % Final    Basophils Relative 04/10/2018 1  0 - 1 % Final    Neutrophils Absolute 04/10/2018 6 19  1 85 - 7 62 Thousands/µL Final    Lymphocytes Absolute 04/10/2018 1 47  0 60 - 4 47 Thousands/µL Final    Monocytes Absolute 04/10/2018 0 40  0 17 - 1 22 Thousand/µL Final    Eosinophils Absolute 04/10/2018 0 55  0 00 - 0 61 Thousand/µL Final    Basophils Absolute 04/10/2018 0 04  0 00 - 0 10 Thousands/µL Final    TSH 3RD GENERATON 04/10/2018 2 520  0 358 - 3 740 uIU/mL Final    Color, UA 04/10/2018 see chart   Final    Color, UA 04/10/2018 Yellow   Final    Clarity, UA 04/10/2018 Clear   Final    pH, UA 04/10/2018 7 0  4 5 - 8 0 Final    Leukocytes, UA 04/10/2018 Small* Negative Final    Nitrite, UA 04/10/2018 Negative  Negative Final    Protein, UA 04/10/2018 Negative  Negative mg/dl Final    Glucose, UA 04/10/2018 Negative  Negative mg/dl Final    Ketones, UA 04/10/2018 Negative  Negative mg/dl Final    Urobilinogen, UA 04/10/2018 0 2  0 2, 1 0 E U /dl E U /dl Final    Bilirubin, UA 04/10/2018 Negative  Negative Final    Blood, UA 04/10/2018 Negative  Negative Final    Specific Gravity, UA 04/10/2018 1 015  1 003 - 1 030 Final    RBC, UA 04/10/2018 None Seen  None Seen, 0-5 /hpf Final    WBC, UA 04/10/2018 20-30* None Seen, 0-5, 5-55, 5-65 /hpf Final    Epithelial Cells 04/10/2018 None Seen  None Seen, Occasional /hpf Final    Bacteria, UA 04/10/2018 Occasional  None Seen, Occasional /hpf Final    Hyaline Casts, UA 04/10/2018 None Seen  None Seen /lpf Final    Urine Culture 04/10/2018 3615-9532 cfu/ml Gram Negative Gil resembling Escherichia coli*  Final    Ventricular Rate 04/10/2018 71  BPM Final    Atrial Rate 04/10/2018 71  BPM Final    SD Interval 04/10/2018 222  ms Final    QRSD Interval 04/10/2018 88  ms Final    QT Interval 04/10/2018 388  ms Final    QTC Interval 04/10/2018 421  ms Final    P Axis 04/10/2018 50  degrees Final    QRS Axis 04/10/2018 22  degrees Final    T Wave [place] 04/10/2018 51  degrees Final    TSH 3RD GENERATON 04/11/2018 3 760* 0 358 - 3 740 uIU/mL Final    T3, Total 04/11/2018 1 00  0 60 - 1 80 ng/mL Final    T4 TOTAL 04/11/2018 10 9  4 7 - 13 3 ug/dL Final    Cholesterol 04/11/2018 187  50 - 200 mg/dL Final    Triglycerides 04/11/2018 132  <=150 mg/dL Final    HDL, Direct 04/11/2018 65* 40 - 60 mg/dL Final    LDL Calculated 04/11/2018 96  0 - 100 mg/dL Final    Non-HDL-Chol (CHOL-HDL) 04/11/2018 122  mg/dl Final    Sodium 04/11/2018 139  136 - 145 mmol/L Final    Potassium 04/11/2018 4 5  3 5 - 5 3 mmol/L Final    Chloride 04/11/2018 106  100 - 108 mmol/L Final    CO2 04/11/2018 25  21 - 32 mmol/L Final    Anion Gap 04/11/2018 8  4 - 13 mmol/L Final    BUN 04/11/2018 22  5 - 25 mg/dL Final    Creatinine 04/11/2018 1 12  0 60 - 1 30 mg/dL Final    Glucose 04/11/2018 96  65 - 140 mg/dL Final    Glucose, Fasting 04/11/2018 96  65 - 99 mg/dL Final    Calcium 04/11/2018 9 4  mg/dL Final    AST 04/11/2018 16  5 - 45 U/L Final    ALT 04/11/2018 18  12 - 78 U/L Final    Alkaline Phosphatase 04/11/2018 126* 46 - 116 U/L Final    Total Protein 04/11/2018 7 5  6 4 - 8 2 g/dL Final    Albumin 04/11/2018 3 7  3 5 - 5 0 g/dL Final    Total Bilirubin 04/11/2018 0 41  0 20 - 1 00 mg/dL Final    eGFR 04/11/2018 55  ml/min/1 73sq m Final    WBC 04/11/2018 10 11  4 31 - 10 16 Thousand/uL Final    RBC 04/11/2018 5 36* 3 81 - 5 12 Million/uL Final    Hemoglobin 04/11/2018 15 1  11 5 - 15 4 g/dL Final    Hematocrit 04/11/2018 47 3* 34 8 - 46 1 % Final    MCV 04/11/2018 88  82 - 98 fL Final    MCH 04/11/2018 28 2  26 8 - 34 3 pg Final    MCHC 04/11/2018 31 9  31 4 - 37 4 g/dL Final    RDW 04/11/2018 14 8  11 6 - 15 1 % Final    MPV 04/11/2018 11 0  8 9 - 12 7 fL Final    Platelets 08/19/4424 194  149 - 390 Thousands/uL Final    nRBC 04/11/2018 0  /100 WBCs Final    RPR 04/11/2018 Non-Reactive  Non-Reactive Final    Segmented % 04/11/2018 75  43 - 75 % Final    Lymphocytes % 04/11/2018 17  14 - 44 % Final    Monocytes % 04/11/2018 4  4 - 12 % Final    Eosinophils % 04/11/2018 4  0 - 6 % Final    Basophils % 04/11/2018 0  0 - 1 % Final    Absolute Neutrophils 04/11/2018 7 58  1 85 - 7 62 Thousand/uL Final    Lymphocytes Absolute 04/11/2018 1 72  0 60 - 4 47 Thousand/uL Final    Monocytes Absolute 04/11/2018 0 40  0 00 - 1 22 Thousand/uL Final    Eosinophils Absolute 04/11/2018 0 40  0 00 - 0 40 Thousand/uL Final    Basophils Absolute 04/11/2018 0 00  0 00 - 0 10 Thousand/uL Final    RBC Morphology 04/11/2018 Normal   Final    Platelet Estimate 13/34/2720 Adequate  Adequate Final       Mental Status Evaluation:  Appearance:  age appropriate and casually dressed   Behavior:  Calm, cooperative, good eye contact   Speech:  normal pitch and normal volume   Mood:  dysthymic   Affect:  mood-congruent   Thought Process:  goal directed   Thought Content:  No delusions voiced   Perceptual Disturbances:  Auditory command hallucinations to hurt herself with a knife, visual hallucinations of man and woman   Risk Potential: Suicidal Ideations none, Homicidal Ideations none and Potential for Aggression No   Sensorium:  person, place and time/date   Cognition:  grossly intact   Consciousness:  alert and awake    Attention: attention span appeared shorter than expected for age   Insight:  limited   Judgment: limited   Gait/Station: normal gait/station   Motor Activity: no abnormal movements     Progress Toward Goals:  Minimal change    Assessment/Plan   Principal Problem:    Schizoaffective disorder, bipolar type ([facility] [address] )  Active Problems:    Encounter for examination and observation for other specified reasons      Recommended Treatment:   Decrease Latuda to 120 mg p o  daily with evening meal   Patient has been on 200 mg for some time with minimal benefit  Add Loxitane 25 mg at HS for augmentation and to further decrease psychotic symptoms  Continue with Cymbalta 120 mg at this time  Restart patient on p r n  Percocet five/325 for back pain  Continue to encourage in milieu participation in group therapies  Continue with group therapy, milieu therapy and occupational therapy  Risks, benefits and possible side effects of Medications:   Risks, benefits, and possible side effects of medications explained to patient and patient verbalizes understanding  Counseling / Coordination of Care  Total floor / unit time spent today 25 minutes  Greater than 50% of total time was spent with the patient and / or family counseling and / or coordination of care  A description of the counseling / coordination of care:  Medication management, chart review, patient interview

## 2018-04-12 NOTE — PLAN OF CARE
Problem: DISCHARGE PLANNING  Goal: Discharge to home or other facility with appropriate resources  INTERVENTIONS:  - Identify barriers to discharge w/patient and caregiver  - Arrange for needed discharge resources and transportation as appropriate  - Identify discharge learning needs (meds, wound care, etc )  - Arrange for interpretive services to assist at discharge as needed  - Refer to Case Management Department for coordinating discharge planning if the patient needs post-hospital services based on physician/advanced practitioner order or complex needs related to functional status, cognitive ability, or social support system   Outcome: Progressing  Pt will return to [facility] upon discharge

## 2018-04-13 PROCEDURE — 99233 SBSQ HOSP IP/OBS HIGH 50: CPT | Performed by: PSYCHIATRY & NEUROLOGY

## 2018-04-13 RX ORDER — LOXAPINE SUCCINATE 25 MG/1
50 TABLET ORAL
Status: DISCONTINUED | OUTPATIENT
Start: 2018-04-13 | End: 2018-04-15

## 2018-04-13 RX ADMIN — FLUTICASONE PROPIONATE AND SALMETEROL 1 PUFF: 50; 250 POWDER RESPIRATORY (INHALATION) at 08:13

## 2018-04-13 RX ADMIN — GABAPENTIN 600 MG: 300 CAPSULE ORAL at 08:12

## 2018-04-13 RX ADMIN — HYDROXYZINE HYDROCHLORIDE 25 MG: 25 TABLET, FILM COATED ORAL at 16:40

## 2018-04-13 RX ADMIN — FLUTICASONE PROPIONATE 2 SPRAY: 50 SPRAY, METERED NASAL at 08:12

## 2018-04-13 RX ADMIN — CLONAZEPAM 0.5 MG: 0.5 TABLET ORAL at 06:35

## 2018-04-13 RX ADMIN — HYDROXYZINE HYDROCHLORIDE 25 MG: 25 TABLET, FILM COATED ORAL at 11:49

## 2018-04-13 RX ADMIN — CLONAZEPAM 0.75 MG: 0.5 TABLET ORAL at 21:44

## 2018-04-13 RX ADMIN — LEVOTHYROXINE SODIUM 137 MCG: 112 TABLET ORAL at 06:35

## 2018-04-13 RX ADMIN — ALBUTEROL SULFATE 2 PUFF: 90 AEROSOL, METERED RESPIRATORY (INHALATION) at 11:50

## 2018-04-13 RX ADMIN — MELATONIN TAB 3 MG 4.5 MG: 3 TAB at 21:45

## 2018-04-13 RX ADMIN — OXYCODONE HYDROCHLORIDE AND ACETAMINOPHEN 1 TABLET: 5; 325 TABLET ORAL at 18:26

## 2018-04-13 RX ADMIN — OXYCODONE HYDROCHLORIDE AND ACETAMINOPHEN 1 TABLET: 5; 325 TABLET ORAL at 01:50

## 2018-04-13 RX ADMIN — GABAPENTIN 600 MG: 300 CAPSULE ORAL at 21:44

## 2018-04-13 RX ADMIN — FOLIC ACID 1 MG: 1 TABLET ORAL at 08:11

## 2018-04-13 RX ADMIN — LOXAPINE 50 MG: 25 CAPSULE ORAL at 21:47

## 2018-04-13 RX ADMIN — DULOXETINE HYDROCHLORIDE 120 MG: 60 CAPSULE, DELAYED RELEASE ORAL at 08:10

## 2018-04-13 RX ADMIN — VITAMIN D, TAB 1000IU (100/BT) 5000 UNITS: 25 TAB at 21:44

## 2018-04-13 RX ADMIN — ALBUTEROL SULFATE 2 PUFF: 90 AEROSOL, METERED RESPIRATORY (INHALATION) at 18:09

## 2018-04-13 RX ADMIN — LORATADINE 10 MG: 10 TABLET ORAL at 08:10

## 2018-04-13 RX ADMIN — OXYBUTYNIN CHLORIDE 20 MG: 5 TABLET, FILM COATED, EXTENDED RELEASE ORAL at 08:12

## 2018-04-13 RX ADMIN — ALBUTEROL SULFATE 2 PUFF: 90 AEROSOL, METERED RESPIRATORY (INHALATION) at 06:34

## 2018-04-13 RX ADMIN — HYDROXYZINE HYDROCHLORIDE 25 MG: 25 TABLET, FILM COATED ORAL at 21:46

## 2018-04-13 RX ADMIN — CLONAZEPAM 0.75 MG: 0.5 TABLET ORAL at 11:48

## 2018-04-13 RX ADMIN — TRAZODONE HYDROCHLORIDE 50 MG: 50 TABLET ORAL at 21:46

## 2018-04-13 RX ADMIN — LAMOTRIGINE 200 MG: 100 TABLET ORAL at 08:10

## 2018-04-13 RX ADMIN — GABAPENTIN 600 MG: 300 CAPSULE ORAL at 16:40

## 2018-04-13 RX ADMIN — OXYCODONE HYDROCHLORIDE AND ACETAMINOPHEN 1 TABLET: 5; 325 TABLET ORAL at 10:23

## 2018-04-13 RX ADMIN — PRAVASTATIN SODIUM 40 MG: 40 TABLET ORAL at 16:41

## 2018-04-13 RX ADMIN — FLUTICASONE PROPIONATE AND SALMETEROL 1 PUFF: 50; 250 POWDER RESPIRATORY (INHALATION) at 18:09

## 2018-04-13 RX ADMIN — PALIPERIDONE 3 MG: 3 TABLET, FILM COATED, EXTENDED RELEASE ORAL at 08:10

## 2018-04-13 RX ADMIN — AMLODIPINE BESYLATE 2.5 MG: 2.5 TABLET ORAL at 08:11

## 2018-04-13 RX ADMIN — LURASIDONE HYDROCHLORIDE 120 MG: 80 TABLET, FILM COATED ORAL at 16:41

## 2018-04-13 RX ADMIN — HYDROXYZINE HYDROCHLORIDE 25 MG: 25 TABLET, FILM COATED ORAL at 06:35

## 2018-04-13 NOTE — CASE MANAGEMENT
Met with Pt and Pt states she is having AH of a man telling her to stab herself but Pt states she is not responding to the [facility] and is able to contract for safety  Pt states she is feeling physically better today and is attending groups  Discharge plan is for Pt to return to [facility] and will receive PCP and [facility] (Psychiatric) follow-up at [facility] after discharge  Contacted Pt's daughter/POA Junito Kothari) and provided update and visiting hours for the weekend

## 2018-04-13 NOTE — PROGRESS NOTES
Pt calm, pleasant, cooperative  Visible on unit for meals and group  Pt said "I feel better than yesterday " Pt experiences auditory and visual hallucinations, she stated "I see 2 people with black hair, and they tell me to kill myself " Pt stated she does not want to hurt or kill herself here  Pt says she is a little anxious about being in the hospital  Denies depression and Hi  C/o pain 8/10 in her back, administered prn percocet  Pt compliant with medications  Pt currently in group, will continue to monitor

## 2018-04-13 NOTE — PROGRESS NOTES
Progress Note - Behavioral Health   Kmiber Negron 63 y o  female MRN: [id number]  Unit/Bed#: ME8 558-01 Encounter: [id number]    Assessment/Plan   Principal Problem:    Schizoaffective disorder, bipolar type ([facility] [address] )  Active Problems:    Encounter for examination and observation for other specified reasons      Behavior over the last 24 hours:  unchanged  Sleep: normal  Appetite: normal  Medication side effects: No  ROS: c/o anxiety    Mental Status Evaluation:  Appearance:  age appropriate and casually dressed   Behavior:  cooperative   Speech:  normal pitch and normal volume   Mood:  anxious and depressed   Affect:  constricted   Thought Process:  goal directed and logical   Thought Content:  normal   Perceptual Disturbances: Auditory hallucinations without commands   Risk Potential: Suicidal Ideations none, Homicidal Ideations none and Potential for Aggression No   Sensorium:  person, place and time/date   Cognition:  grossly intact   Consciousness:  alert and awake    Attention: attention span appeared shorter than expected for age   Insight:  limited   Judgment: limited   Gait/Station: normal gait/station and normal balance   Motor Activity: no abnormal movements     Progress Toward Goals: Patient remains compliant with treatment and reported feeling less depressed, denies SI  She is still reporting high anxiety and A/V hallucinations  She is tolerating well Loxapine and will increase dose to 50 mg for tonight  She had a Clonazepam increase recently for her anxiety  Recommended Treatment: Continue with group therapy, milieu therapy and occupational therapy  Risks, benefits and possible side effects of Medications:   Risks, benefits, and possible side effects of medications explained to patient and patient verbalizes understanding  Medications: all current active meds have been reviewed  Labs: reviewed    Counseling / Coordination of Care  Total floor / unit time spent today n/a minutes   Greater than 50% of total time was spent with the patient and / or family counseling and / or coordination of care   A description of the counseling / coordination of care:

## 2018-04-13 NOTE — PLAN OF CARE
Problem: Ineffective Coping  Goal: Participates in unit activities  Interventions:  - Provide therapeutic environment   - Provide required programming   - Redirect inappropriate behaviors    Outcome: Progressing  Pt cheerful and able to complete a relapse prevention plan with some staff cueing  She displayed empathy toward her peers and took on a helper role in structured groups

## 2018-04-13 NOTE — PLAN OF CARE
Problem: DISCHARGE PLANNING  Goal: Discharge to home or other facility with appropriate resources  INTERVENTIONS:  - Identify barriers to discharge w/patient and caregiver  - Arrange for needed discharge resources and transportation as appropriate  - Identify discharge learning needs (meds, wound care, etc )  - Arrange for interpretive services to assist at discharge as needed  - Refer to Case Management Department for coordinating discharge planning if the patient needs post-hospital services based on physician/advanced practitioner order or complex needs related to functional status, cognitive ability, or social support system   Outcome: Progressing  Pt will return to [facility] upon discharge

## 2018-04-13 NOTE — PROGRESS NOTES
Pt is alert, pleasant, cooperative  Seclusive to her room  No loose stools this evening  Denies SI/HI, states she sometimes sees men telling her to hurt herself  Pt does contract for safety on the unit

## 2018-04-13 NOTE — PROGRESS NOTES
Pt c/o pain, 8/10 on pain scale in back  PRN percocet administered at 1023  Will continue to monitor

## 2018-04-14 PROCEDURE — 99231 SBSQ HOSP IP/OBS SF/LOW 25: CPT | Performed by: PSYCHIATRY & NEUROLOGY

## 2018-04-14 RX ADMIN — CLONAZEPAM 0.5 MG: 0.5 TABLET ORAL at 06:24

## 2018-04-14 RX ADMIN — ALBUTEROL SULFATE 2 PUFF: 90 AEROSOL, METERED RESPIRATORY (INHALATION) at 12:35

## 2018-04-14 RX ADMIN — ALBUTEROL SULFATE 2 PUFF: 90 AEROSOL, METERED RESPIRATORY (INHALATION) at 06:24

## 2018-04-14 RX ADMIN — ALBUTEROL SULFATE 2 PUFF: 90 AEROSOL, METERED RESPIRATORY (INHALATION) at 22:31

## 2018-04-14 RX ADMIN — LURASIDONE HYDROCHLORIDE 120 MG: 80 TABLET, FILM COATED ORAL at 17:17

## 2018-04-14 RX ADMIN — ALBUTEROL SULFATE 2 PUFF: 90 AEROSOL, METERED RESPIRATORY (INHALATION) at 17:16

## 2018-04-14 RX ADMIN — AMLODIPINE BESYLATE 2.5 MG: 2.5 TABLET ORAL at 08:41

## 2018-04-14 RX ADMIN — HYDROXYZINE HYDROCHLORIDE 25 MG: 25 TABLET, FILM COATED ORAL at 21:30

## 2018-04-14 RX ADMIN — OXYBUTYNIN CHLORIDE 20 MG: 5 TABLET, FILM COATED, EXTENDED RELEASE ORAL at 08:40

## 2018-04-14 RX ADMIN — TRAZODONE HYDROCHLORIDE 50 MG: 50 TABLET ORAL at 21:21

## 2018-04-14 RX ADMIN — GABAPENTIN 600 MG: 300 CAPSULE ORAL at 21:21

## 2018-04-14 RX ADMIN — MELATONIN TAB 3 MG 4.5 MG: 3 TAB at 21:22

## 2018-04-14 RX ADMIN — LOXAPINE 50 MG: 25 CAPSULE ORAL at 21:21

## 2018-04-14 RX ADMIN — FOLIC ACID 1 MG: 1 TABLET ORAL at 08:41

## 2018-04-14 RX ADMIN — ALBUTEROL SULFATE 2 PUFF: 90 AEROSOL, METERED RESPIRATORY (INHALATION) at 00:06

## 2018-04-14 RX ADMIN — LORATADINE 10 MG: 10 TABLET ORAL at 08:41

## 2018-04-14 RX ADMIN — PRAVASTATIN SODIUM 40 MG: 40 TABLET ORAL at 17:17

## 2018-04-14 RX ADMIN — OXYCODONE HYDROCHLORIDE AND ACETAMINOPHEN 1 TABLET: 5; 325 TABLET ORAL at 02:42

## 2018-04-14 RX ADMIN — HYDROXYZINE HYDROCHLORIDE 25 MG: 25 TABLET, FILM COATED ORAL at 17:17

## 2018-04-14 RX ADMIN — DULOXETINE HYDROCHLORIDE 120 MG: 60 CAPSULE, DELAYED RELEASE ORAL at 08:41

## 2018-04-14 RX ADMIN — HYDROXYZINE HYDROCHLORIDE 25 MG: 25 TABLET, FILM COATED ORAL at 10:31

## 2018-04-14 RX ADMIN — LAMOTRIGINE 200 MG: 100 TABLET ORAL at 08:41

## 2018-04-14 RX ADMIN — GABAPENTIN 600 MG: 300 CAPSULE ORAL at 17:16

## 2018-04-14 RX ADMIN — OXYCODONE HYDROCHLORIDE AND ACETAMINOPHEN 1 TABLET: 5; 325 TABLET ORAL at 10:31

## 2018-04-14 RX ADMIN — HYDROXYZINE HYDROCHLORIDE 25 MG: 25 TABLET, FILM COATED ORAL at 06:24

## 2018-04-14 RX ADMIN — GABAPENTIN 600 MG: 300 CAPSULE ORAL at 08:41

## 2018-04-14 RX ADMIN — FLUTICASONE PROPIONATE AND SALMETEROL 1 PUFF: 50; 250 POWDER RESPIRATORY (INHALATION) at 17:15

## 2018-04-14 RX ADMIN — CLONAZEPAM 0.75 MG: 0.5 TABLET ORAL at 21:21

## 2018-04-14 RX ADMIN — FLUTICASONE PROPIONATE 2 SPRAY: 50 SPRAY, METERED NASAL at 08:45

## 2018-04-14 RX ADMIN — VITAMIN D, TAB 1000IU (100/BT) 5000 UNITS: 25 TAB at 21:20

## 2018-04-14 RX ADMIN — OXYCODONE HYDROCHLORIDE AND ACETAMINOPHEN 1 TABLET: 5; 325 TABLET ORAL at 18:52

## 2018-04-14 RX ADMIN — LEVOTHYROXINE SODIUM 137 MCG: 112 TABLET ORAL at 06:24

## 2018-04-14 RX ADMIN — CLONAZEPAM 0.75 MG: 0.5 TABLET ORAL at 12:37

## 2018-04-14 RX ADMIN — FLUTICASONE PROPIONATE AND SALMETEROL 1 PUFF: 50; 250 POWDER RESPIRATORY (INHALATION) at 08:46

## 2018-04-14 NOTE — PROGRESS NOTES
Progress Note - Behavioral Health     Yanet Jasmine 63 y o  female MRN: [id number]  Unit/Bed#: FL6 558-01 Encounter: [id number]    Yanet Jasmine was seen for continuing care and reviewed with treatment team   Pt reported feeling"Anxious" and "A little bit depressed" due to being here  She also reported AV hallucinations of a man and woman laughing at her and commanding her to stab herself  The last hallucinations occurred this morning  She denies any command hallucinations to harm others and also presently denies SI, HI or paranoia  She rates her strength of resolve to resist the voices at 5/10  She does report sleeping well on the Trazodone  Floor team relates Pt has been pleasant, medication compliant and visible and social in the milieu  She required Percocet for pain last night and slept afterwards  Today Pt reported command hallucinations to nursing but was able to contract for safety      Sleep: Good  Appetite: Good  Medication side effects: None Per Pt  ROS: No complaints at this time    Labs/Tests: Reviewed    Mental Status Evaluation:  Appearance:  Dressed, adequate hygiene, good eye contact   Behavior:  Calm, cooperative, pleasant   Speech:  Clear, normal rate and volume   Mood:  Anxious, Depressed   Affect:  Blunted   Thought Process:  Organized, Goal directed   Associations: Intact associations   Thought Content:  No verbalized delusions   Perceptual Disturbances: Pt is having A-V hallucinations, but does not appear to be responding to internal stimuli   Risk Potential: Pt presently denies SI or HI    Sensorium:  Self, birthday, Place, Day of the week, Month, Year   Memory:  short term memory grossly intact   Consciousness:  alert, awake   Attention: Good   Insight:  Fair   Judgment: Fair   Gait/Station: Normal gait/station   Motor Activity: No abnormal movements     Vitals:    04/13/18 0712 04/13/18 0810 04/13/18 1506 04/14/18 0700   BP: 119/59 119/59 120/58 (!) 173/81   BP Location: Left arm  Left arm Right arm   Pulse: 70  80 90   Resp: 16  18 16   Temp: 97 9 °F (36 6 °C)  97 5 °F (36 4 °C) (!) 97 4 °F (36 3 °C)   TempSrc: Tympanic  Tympanic Oral   SpO2: 98%  98% 97%   Weight:       Height:           Admission on 04/10/2018   Component Date Value    Amph/Meth UR 04/10/2018 Negative     Barbiturate Ur 04/10/2018 Negative     Benzodiazepine Urine 04/10/2018 Negative     Cocaine Urine 04/10/2018 Negative     Methadone Urine 04/10/2018 Negative     Opiate Urine 04/10/2018 Negative     PCP Ur 04/10/2018 Negative     THC Urine 04/10/2018 Negative     EXTBreath Alcohol 04/10/2018 negative     Sodium 04/10/2018 139     Potassium 04/10/2018 4 9     Chloride 04/10/2018 104     CO2 04/10/2018 31     Anion Gap 04/10/2018 4     BUN 04/10/2018 25     Creatinine 04/10/2018 1 35*    Glucose 04/10/2018 130     Calcium 04/10/2018 9 2     AST 04/10/2018 12     ALT 04/10/2018 16     Alkaline Phosphatase 04/10/2018 125*    Total Protein 04/10/2018 7 1     Albumin 04/10/2018 3 7     Total Bilirubin 04/10/2018 0 38     eGFR 04/10/2018 44     WBC 04/10/2018 8 67     RBC 04/10/2018 4 82     Hemoglobin 04/10/2018 13 4     Hematocrit 04/10/2018 42 5     MCV 04/10/2018 88     MCH 04/10/2018 27 8     MCHC 04/10/2018 31 5     RDW 04/10/2018 14 6     MPV 04/10/2018 9 1     Platelets 57/43/1046 177     nRBC 04/10/2018 0     Neutrophils Relative 04/10/2018 71     Lymphocytes Relative 04/10/2018 17     Monocytes Relative 04/10/2018 5     Eosinophils Relative 04/10/2018 6     Basophils Relative 04/10/2018 1     Neutrophils Absolute 04/10/2018 6 19     Lymphocytes Absolute 04/10/2018 1 47     Monocytes Absolute 04/10/2018 0 40     Eosinophils Absolute 04/10/2018 0 55     Basophils Absolute 04/10/2018 0 04     TSH 3RD GENERATON 04/10/2018 2 520     Color, UA 04/10/2018 see chart     Color, UA 04/10/2018 Yellow     Clarity, UA 04/10/2018 Clear     pH, UA 04/10/2018 7 0     Leukocytes, UA 04/10/2018 Small*    Nitrite, UA 04/10/2018 Negative     Protein, UA 04/10/2018 Negative     Glucose, UA 04/10/2018 Negative     Ketones, UA 04/10/2018 Negative     Urobilinogen, UA 04/10/2018 0 2     Bilirubin, UA 04/10/2018 Negative     Blood, UA 04/10/2018 Negative     Specific Gravity, UA 04/10/2018 1 015     RBC, UA 04/10/2018 None Seen     WBC, UA 04/10/2018 20-30*    Epithelial Cells 04/10/2018 None Seen     Bacteria, UA 04/10/2018 Occasional     Hyaline Casts, UA 04/10/2018 None Seen     Urine Culture 04/10/2018 3207-4874 cfu/ml Gram Negative Gil resembling Escherichia coli*    Ventricular Rate 04/10/2018 71     Atrial Rate 04/10/2018 71     RI Interval 04/10/2018 222     QRSD Interval 04/10/2018 88     QT Interval 04/10/2018 388     QTC Interval 04/10/2018 421     P Axis 04/10/2018 50     QRS Axis 04/10/2018 22     T Wave Axis 04/10/2018 51     TSH 3RD GENERATON 04/11/2018 3 760*    T3, Total 04/11/2018 1 00     T4 TOTAL 04/11/2018 10 9     Cholesterol 04/11/2018 187     Triglycerides 04/11/2018 132     HDL, Direct 04/11/2018 65*    LDL Calculated 04/11/2018 96     Non-HDL-Chol (CHOL-HDL) 04/11/2018 122     Sodium 04/11/2018 139     Potassium 04/11/2018 4 5     Chloride 04/11/2018 106     CO2 04/11/2018 25     Anion Gap 04/11/2018 8     BUN 04/11/2018 22     Creatinine 04/11/2018 1 12     Glucose 04/11/2018 96     Glucose, Fasting 04/11/2018 96     Calcium 04/11/2018 9 4     AST 04/11/2018 16     ALT 04/11/2018 18     Alkaline Phosphatase 04/11/2018 126*    Total Protein 04/11/2018 7 5     Albumin 04/11/2018 3 7     Total Bilirubin 04/11/2018 0 41     eGFR 04/11/2018 55     WBC 04/11/2018 10 11     RBC 04/11/2018 5 36*    Hemoglobin 04/11/2018 15 1     Hematocrit 04/11/2018 47 3*    MCV 04/11/2018 88     MCH 04/11/2018 28 2     MCHC 04/11/2018 31 9     RDW 04/11/2018 14 8     MPV 04/11/2018 11 0     Platelets 74/24/7778 194     nRBC 04/11/2018 0  RPR 04/11/2018 Non-Reactive     Segmented % 04/11/2018 75     Lymphocytes % 04/11/2018 17     Monocytes % 04/11/2018 4     Eosinophils % 04/11/2018 4     Basophils % 04/11/2018 0     Absolute Neutrophils 04/11/2018 7 58     Lymphocytes Absolute 04/11/2018 1 72     Monocytes Absolute 04/11/2018 0 40     Eosinophils Absolute 04/11/2018 0 40     Basophils Absolute 04/11/2018 0 00     RBC Morphology 04/11/2018 Normal     Platelet Estimate 37/85/3972 Adequate        Progress Toward Goals:  Pt has made some small improvement in mood on review of notes  Loxitane was increased last night for psychotic Sxs and will observe for effect and titrate further as warranted  Repeat BP  Assessment/Plan   Principal Problem:    Schizoaffective disorder, bipolar type ([address] )  Active Problems:    Encounter for examination and observation for other specified reasons      Recommended Treatment: Continue with pharmacotherapy, group therapy, milieu therapy and occupational therapy    The patient will be maintained on the following medications:    Current Facility-Administered Medications:  acetaminophen 650 mg Oral Q6H PRN Jose Salcido MD   acetaminophen 650 mg Oral Q4H PRN Jose Salcido MD   acetaminophen 975 mg Oral Q6H PRN Jose aSlcido MD   albuterol 2 puff Inhalation Q6H Jose Salcido MD   aluminum-magnesium hydroxide-simethicone 30 mL Oral Q4H PRN Jose Salcido MD   amLODIPine 2 5 mg Oral Daily Jose Salcido MD   benztropine 1 mg Intramuscular Q1H PRN Jose Salcido MD   benztropine 1 mg Oral Q1H PRN Jose Salicdo MD   bisacodyl 10 mg Rectal Daily PRN Jose Salcido MD   cholecalciferol 5,000 Units Oral HS Jose Salcido MD   clonazePAM 0 5 mg Oral Early Morning Jose Salcido MD   clonazePAM 0 75 mg Oral HS Jose Salcido MD   clonazePAM 0 75 mg Oral Daily With Jacqueline Sanderson MD DULoxetine 120 mg Oral Daily [place] Breed, MD   fluticasone 2 spray Nasal Daily [place] Breed, MD   fluticasone-salmeterol 1 puff Inhalation BID [place] Breed, MD   folic acid 1 mg Oral Daily [place] Breed, MD   gabapentin 600 mg Oral TID [place] Breed, MD   haloperidol 5 mg Oral Q6H PRN [place] Breed, MD   haloperidol lactate 5 mg Intramuscular Q6H PRN [place] Breed, MD   hydrOXYzine HCL 25 mg Oral Q6H [place] Breed, MD   lamoTRIgine 200 mg Oral Daily [place] Breed, MD   levothyroxine 137 mcg Oral Early Morning [place] Breed, MD   loperamide 2 mg Oral Q4H PRN [place] Breed, MD   loratadine 10 mg Oral Daily [place] Breed, MD   LORazepam 2 mg Intramuscular Q4H PRN [place] Breed, MD   LORazepam 1 mg Oral Q6H PRN [place] Breed, MD   loxapine 50 mg Oral HS [place] Breed, MD   lurasidone 120 mg Oral Daily With Miguel Lara PA-C   magnesium hydroxide 30 mL Oral Daily PRN [place] Breed, MD   melatonin 4 5 mg Oral HS [place] Breed, MD   OLANZapine 5 mg Oral Q3H PRN Balwinder Breed, MD   oxybutynin 20 mg Oral Daily [place] Breed, MD   oxyCODONE-acetaminophen 1 tablet Oral Q8H PRN Amanda Sosa PA-C   pravastatin 40 mg Oral Daily With Ruby Leaks, MD   risperiDONE 1 mg Oral Q3H PRN [place] Breed, MD   traZODone 50 mg Oral HS PRN [place] Breed, MD   traZODone 50 mg Oral HS [place] Breed, MD   ziprasidone 20 mg Intramuscular Q4H PRN [place] Breed, MD       Risks, benefits and possible side effects of Medications:   Risks, benefits, and possible side effects of medications explained to patient and patient verbalizes understanding

## 2018-04-14 NOTE — PROGRESS NOTES
Pt c/o of generalized body pain 8/10 this shift  PRN Percocet given with positive effect  Pt pleasant and bright upon approach  OOB for meals only  Pt did not participate in group  Denies SI  Will continue to monitor

## 2018-04-14 NOTE — PROGRESS NOTES
Pt OOB for breakfast, calm and pleasant upon approach  Pt remains medication compliant and c/o generalized pain 8/10 and was given prn Percocet at 10:30am   Pt visible in the milieu, social with peers and staff  Pt denies SI/HI, and continues to report CAH= sees 2 man telling her to hurt herself  Pt states, "I know they are not real and I stay out in dayroom, I watch tv so I don't have to listen to them"  Pt contracted for safety on the unit  Will continue to monitor  Susanne Rapp

## 2018-04-14 NOTE — PROGRESS NOTES
Pt cooperative with care  Pt denied depression, but reported moderate anxiety  Pt reported that she is feeling anxious about her discharge home, and how soon she will get to go home  Pt denied any any SI/HI  Pt reported hallucinations, stating "two people wearing all green, with long dark hair are telling me to hurt myself"  Pt then stated that she is confident that she will not act on it, as she feels safe while out in the milieu  Pt reported generalized aches  Pt was compliant with her medications  Will continue to monitor

## 2018-04-15 PROBLEM — B36.9 FUNGAL DERMATITIS: Status: ACTIVE | Noted: 2018-04-15

## 2018-04-15 PROCEDURE — 99232 SBSQ HOSP IP/OBS MODERATE 35: CPT | Performed by: PSYCHIATRY & NEUROLOGY

## 2018-04-15 RX ORDER — NYSTATIN 100000 [USP'U]/G
POWDER TOPICAL 3 TIMES DAILY
Status: DISCONTINUED | OUTPATIENT
Start: 2018-04-15 | End: 2018-04-18 | Stop reason: HOSPADM

## 2018-04-15 RX ORDER — LOXAPINE SUCCINATE 25 MG/1
75 TABLET ORAL
Status: DISCONTINUED | OUTPATIENT
Start: 2018-04-15 | End: 2018-04-18 | Stop reason: HOSPADM

## 2018-04-15 RX ORDER — HYDROXYZINE HYDROCHLORIDE 25 MG/1
25 TABLET, FILM COATED ORAL 3 TIMES DAILY
Status: DISCONTINUED | OUTPATIENT
Start: 2018-04-15 | End: 2018-04-18 | Stop reason: HOSPADM

## 2018-04-15 RX ADMIN — LAMOTRIGINE 200 MG: 100 TABLET ORAL at 08:54

## 2018-04-15 RX ADMIN — LOXAPINE 75 MG: 25 CAPSULE ORAL at 21:09

## 2018-04-15 RX ADMIN — GABAPENTIN 600 MG: 300 CAPSULE ORAL at 16:42

## 2018-04-15 RX ADMIN — NYSTATIN: 100000 POWDER TOPICAL at 21:08

## 2018-04-15 RX ADMIN — GABAPENTIN 600 MG: 300 CAPSULE ORAL at 08:54

## 2018-04-15 RX ADMIN — FLUTICASONE PROPIONATE 2 SPRAY: 50 SPRAY, METERED NASAL at 08:56

## 2018-04-15 RX ADMIN — HYDROXYZINE HYDROCHLORIDE 25 MG: 25 TABLET, FILM COATED ORAL at 03:38

## 2018-04-15 RX ADMIN — DULOXETINE HYDROCHLORIDE 120 MG: 60 CAPSULE, DELAYED RELEASE ORAL at 08:53

## 2018-04-15 RX ADMIN — LURASIDONE HYDROCHLORIDE 120 MG: 80 TABLET, FILM COATED ORAL at 16:42

## 2018-04-15 RX ADMIN — FLUTICASONE PROPIONATE AND SALMETEROL 1 PUFF: 50; 250 POWDER RESPIRATORY (INHALATION) at 08:56

## 2018-04-15 RX ADMIN — OXYCODONE HYDROCHLORIDE AND ACETAMINOPHEN 1 TABLET: 5; 325 TABLET ORAL at 19:51

## 2018-04-15 RX ADMIN — AMLODIPINE BESYLATE 2.5 MG: 2.5 TABLET ORAL at 08:54

## 2018-04-15 RX ADMIN — HYDROXYZINE HYDROCHLORIDE 25 MG: 25 TABLET, FILM COATED ORAL at 21:09

## 2018-04-15 RX ADMIN — ALBUTEROL SULFATE 2 PUFF: 90 AEROSOL, METERED RESPIRATORY (INHALATION) at 22:15

## 2018-04-15 RX ADMIN — GABAPENTIN 600 MG: 300 CAPSULE ORAL at 21:09

## 2018-04-15 RX ADMIN — ALBUTEROL SULFATE 2 PUFF: 90 AEROSOL, METERED RESPIRATORY (INHALATION) at 06:16

## 2018-04-15 RX ADMIN — VITAMIN D, TAB 1000IU (100/BT) 5000 UNITS: 25 TAB at 21:08

## 2018-04-15 RX ADMIN — LORATADINE 10 MG: 10 TABLET ORAL at 08:54

## 2018-04-15 RX ADMIN — LEVOTHYROXINE SODIUM 137 MCG: 112 TABLET ORAL at 06:15

## 2018-04-15 RX ADMIN — CLONAZEPAM 0.75 MG: 0.5 TABLET ORAL at 21:08

## 2018-04-15 RX ADMIN — HYDROXYZINE HYDROCHLORIDE 25 MG: 25 TABLET, FILM COATED ORAL at 11:50

## 2018-04-15 RX ADMIN — CLONAZEPAM 0.5 MG: 0.5 TABLET ORAL at 06:15

## 2018-04-15 RX ADMIN — OXYCODONE HYDROCHLORIDE AND ACETAMINOPHEN 1 TABLET: 5; 325 TABLET ORAL at 11:50

## 2018-04-15 RX ADMIN — HYDROXYZINE HYDROCHLORIDE 25 MG: 25 TABLET, FILM COATED ORAL at 16:42

## 2018-04-15 RX ADMIN — FLUTICASONE PROPIONATE AND SALMETEROL 1 PUFF: 50; 250 POWDER RESPIRATORY (INHALATION) at 17:43

## 2018-04-15 RX ADMIN — FOLIC ACID 1 MG: 1 TABLET ORAL at 08:54

## 2018-04-15 RX ADMIN — TRAZODONE HYDROCHLORIDE 50 MG: 50 TABLET ORAL at 21:09

## 2018-04-15 RX ADMIN — MELATONIN TAB 3 MG 4.5 MG: 3 TAB at 21:09

## 2018-04-15 RX ADMIN — CLONAZEPAM 0.75 MG: 0.5 TABLET ORAL at 11:50

## 2018-04-15 RX ADMIN — ALBUTEROL SULFATE 2 PUFF: 90 AEROSOL, METERED RESPIRATORY (INHALATION) at 11:51

## 2018-04-15 RX ADMIN — PRAVASTATIN SODIUM 40 MG: 40 TABLET ORAL at 16:42

## 2018-04-15 RX ADMIN — OXYBUTYNIN CHLORIDE 20 MG: 5 TABLET, FILM COATED, EXTENDED RELEASE ORAL at 08:54

## 2018-04-15 RX ADMIN — ALBUTEROL SULFATE 2 PUFF: 90 AEROSOL, METERED RESPIRATORY (INHALATION) at 16:43

## 2018-04-15 RX ADMIN — OXYCODONE HYDROCHLORIDE AND ACETAMINOPHEN 1 TABLET: 5; 325 TABLET ORAL at 03:39

## 2018-04-15 NOTE — PROGRESS NOTES
Progress Note - Behavioral Health     Yanet Jasmine 63 y o  female MRN: [id number]  Unit/Bed#: ZU7 558-01 Encounter: [id number]    Yanet Jasmine was seen for continuing care and reviewed with treatment team   Pt reported "The people that I see, I did not see after lunch" yesterday  However, she apparently did have the AV hallucinations yesterday evening according to nursing notation  Today Pt states the visions of the people have reduced  Her anxiety is the same but she feels "A little bit" depressed  She presently denies SI, HI, AH,or tactile hallucinations  Floor team relayed that she reported AV hallucinations yesterday evening with command to hurt herself, however she also expressed resolutely that she would not act on the commands  She has been using Percocet for foot and back pain with benefit and has remained compliant with medications and visible in the milieu  Nursing reports Pt appears to have a fungal rash under her breasts and in abdominal skin folds  Sleep:  Good  Appetite: Good  Medication side effects: None per Pt  ROS: as per HPI--no present c/o pain by Pt    Labs/Tests: Reviewed    Mental Status Evaluation:  Appearance:  Dressed, adequate hygiene, good eye contat   Behavior:  Calm, cooperative, pleasant   Speech:  Clear, normal rate and volume   Mood:  Anxious, Less depressed   Affect:  Blunted   Thought Process:  Organized, Goal directed   Associations: Intact associations   Thought Content:  No verbalized delusions   Perceptual Disturbances: Pt most recently reports only visual hallucinations and denies paranoia    She does not appear to be responding to internal stimuil   Risk Potential: Pt presently denies SI or HI    Sensorium:  Self, birthday, Place, Day of the week, Month, Year   Memory:  short term memory grossly intact   Consciousness:  alert, awake   Attention: Good   Insight:  Fair   Judgment: Fair   Gait/Station: Normal gait/station   Motor Activity: No abnormal movements     Vitals:    04/15/18 0702   BP: 128/66   Pulse: 68   Resp: 17   Temp: (!) 96 7 °F (35 9 °C)   SpO2: 95%       Admission on 04/10/2018   Component Date Value    Amph/Meth UR 04/10/2018 Negative     Barbiturate Ur 04/10/2018 Negative     Benzodiazepine Urine 04/10/2018 Negative     Cocaine Urine 04/10/2018 Negative     Methadone Urine 04/10/2018 Negative     Opiate Urine 04/10/2018 Negative     PCP Ur 04/10/2018 Negative     THC Urine 04/10/2018 Negative     EXTBreath Alcohol 04/10/2018 negative     Sodium 04/10/2018 139     Potassium 04/10/2018 4 9     Chloride 04/10/2018 104     CO2 04/10/2018 31     Anion Gap 04/10/2018 4     BUN 04/10/2018 25     Creatinine 04/10/2018 1 35*    Glucose 04/10/2018 130     Calcium 04/10/2018 9 2     AST 04/10/2018 12     ALT 04/10/2018 16     Alkaline Phosphatase 04/10/2018 125*    Total Protein 04/10/2018 7 1     Albumin 04/10/2018 3 7     Total Bilirubin 04/10/2018 0 38     eGFR 04/10/2018 44     WBC 04/10/2018 8 67     RBC 04/10/2018 4 82     Hemoglobin 04/10/2018 13 4     Hematocrit 04/10/2018 42 5     MCV 04/10/2018 88     MCH 04/10/2018 27 8     MCHC 04/10/2018 31 5     RDW 04/10/2018 14 6     MPV 04/10/2018 9 1     Platelets 61/46/1456 177     nRBC 04/10/2018 0     Neutrophils Relative 04/10/2018 71     Lymphocytes Relative 04/10/2018 17     Monocytes Relative 04/10/2018 5     Eosinophils Relative 04/10/2018 6     Basophils Relative 04/10/2018 1     Neutrophils Absolute 04/10/2018 6 19     Lymphocytes Absolute 04/10/2018 1 47     Monocytes Absolute 04/10/2018 0 40     Eosinophils Absolute 04/10/2018 0 55     Basophils Absolute 04/10/2018 0 04     TSH 3RD GENERATON 04/10/2018 2 520     Color, UA 04/10/2018 see chart     Color, UA 04/10/2018 Yellow     Clarity, UA 04/10/2018 Clear     pH, UA 04/10/2018 7 0     Leukocytes, UA 04/10/2018 Small*    Nitrite, UA 04/10/2018 Negative     Protein, UA 04/10/2018 Negative  Glucose, UA 04/10/2018 Negative     Ketones, UA 04/10/2018 Negative     Urobilinogen, UA 04/10/2018 0 2     Bilirubin, UA 04/10/2018 Negative     Blood, UA 04/10/2018 Negative     Specific Gravity, UA 04/10/2018 1 015     RBC, UA 04/10/2018 None Seen     WBC, UA 04/10/2018 20-30*    Epithelial Cells 04/10/2018 None Seen     Bacteria, UA 04/10/2018 Occasional     Hyaline Casts, UA 04/10/2018 None Seen     Urine Culture 04/10/2018 6344-1164 cfu/ml Gram Negative Gil resembling Escherichia coli*    Ventricular Rate 04/10/2018 71     Atrial Rate 04/10/2018 71     VA Interval 04/10/2018 222     QRSD Interval 04/10/2018 88     QT Interval 04/10/2018 388     QTC Interval 04/10/2018 421     P Axis 04/10/2018 50     QRS Axis 04/10/2018 22     T Wave Axis 04/10/2018 51     TSH 3RD GENERATON 04/11/2018 3 760*    T3, Total 04/11/2018 1 00     T4 TOTAL 04/11/2018 10 9     Cholesterol 04/11/2018 187     Triglycerides 04/11/2018 132     HDL, Direct 04/11/2018 65*    LDL Calculated 04/11/2018 96     Non-HDL-Chol (CHOL-HDL) 04/11/2018 122     Sodium 04/11/2018 139     Potassium 04/11/2018 4 5     Chloride 04/11/2018 106     CO2 04/11/2018 25     Anion Gap 04/11/2018 8     BUN 04/11/2018 22     Creatinine 04/11/2018 1 12     Glucose 04/11/2018 96     Glucose, Fasting 04/11/2018 96     Calcium 04/11/2018 9 4     AST 04/11/2018 16     ALT 04/11/2018 18     Alkaline Phosphatase 04/11/2018 126*    Total Protein 04/11/2018 7 5     Albumin 04/11/2018 3 7     Total Bilirubin 04/11/2018 0 41     eGFR 04/11/2018 55     WBC 04/11/2018 10 11     RBC 04/11/2018 5 36*    Hemoglobin 04/11/2018 15 1     Hematocrit 04/11/2018 47 3*    MCV 04/11/2018 88     MCH 04/11/2018 28 2     MCHC 04/11/2018 31 9     RDW 04/11/2018 14 8     MPV 04/11/2018 11 0     Platelets 44/56/4790 194     nRBC 04/11/2018 0     RPR 04/11/2018 Non-Reactive     Segmented % 04/11/2018 75     Lymphocytes % 04/11/2018 17     Monocytes % 04/11/2018 4     Eosinophils % 04/11/2018 4     Basophils % 04/11/2018 0     Absolute Neutrophils 04/11/2018 7 58     Lymphocytes Absolute 04/11/2018 1 72     Monocytes Absolute 04/11/2018 0 40     Eosinophils Absolute 04/11/2018 0 40     Basophils Absolute 04/11/2018 0 00     RBC Morphology 04/11/2018 Normal     Platelet Estimate 59/62/7796 Adequate        Progress Toward Goals:  Slow progress is being made  I will further increase Loxitane to 75mg qhs for psychotic Sx control  Per nursing, Pt is doing well with Hydroxzyine and does not seem to be needing it in the night, so I will make it tid for her anxiety  Add Nystatin powder for rash  Assessment/Plan   Principal Problem:    Schizoaffective disorder, bipolar type ([facility] [address] )  Active Problems:    Encounter for examination and observation for other specified reasons    Fungal dermatitis      Recommended Treatment: Continue with pharmacotherapy, group therapy, milieu therapy and occupational therapy    The patient will be maintained on the following medications:    Current Facility-Administered Medications:  acetaminophen 650 mg Oral Q6H PRN Ethan Rowland MD   acetaminophen 650 mg Oral Q4H PRN Ethan Rowland MD   acetaminophen 975 mg Oral Q6H PRN Ethan Rowland MD   albuterol 2 puff Inhalation Q6H Ethan Rowland MD   aluminum-magnesium hydroxide-simethicone 30 mL Oral Q4H PRN Ethan Rowland MD   amLODIPine 2 5 mg Oral Daily Ethan Rowland MD   benztropine 1 mg Intramuscular Q1H PRN Ethan Rowland MD   benztropine 1 mg Oral Q1H PRN Ethan Rowland MD   bisacodyl 10 mg Rectal Daily PRN Ethan Rowland MD   cholecalciferol 5,000 Units Oral HS Ethan Rowland MD   clonazePAM 0 5 mg Oral Early Morning Ethan Rowland MD   clonazePAM 0 75 mg Oral HS Ethan Rowland MD   clonazePAM 0 75 mg Oral Daily With Wendel Alpers, MD DULoxetine 120 mg Oral Daily Gissell Flatter, MD   fluticasone 2 spray Nasal Daily Gissell Flatter, MD   fluticasone-salmeterol 1 puff Inhalation BID Gissell Flatter, MD   folic acid 1 mg Oral Daily Gissell Flatter, MD   gabapentin 600 mg Oral TID Gissell Flatter, MD   haloperidol 5 mg Oral Q6H PRN Gissell Flatter, MD   haloperidol lactate 5 mg Intramuscular Q6H PRN Gissell Flatter, MD   hydrOXYzine HCL 25 mg Oral TID Karen Crowell PA-C   lamoTRIgine 200 mg Oral Daily Gissell Flatter, MD   levothyroxine 137 mcg Oral Early Morning Gisslel Flatter, MD   loperamide 2 mg Oral Q4H PRN Gissell Flatter, MD   loratadine 10 mg Oral Daily Gissell Flatter, MD   LORazepam 2 mg Intramuscular Q4H PRN Gissell Flatter, MD   LORazepam 1 mg Oral Q6H PRN Gissell Flatter, MD   loxapine 75 mg Oral HS Karen Crowell PA-C   lurasidone 120 mg Oral Daily With Miguel Lara PA-RUTHANN   magnesium hydroxide 30 mL Oral Daily PRN Gissell Flatter, MD   melatonin 4 5 mg Oral HS Gissell Flatter, MD   nystatin  Topical TID Karen Crowell PA-C   OLANZapine 5 mg Oral Q3H PRN Gissell Flatter, MD   oxybutynin 20 mg Oral Daily Gissell Flatter, MD   oxyCODONE-acetaminophen 1 tablet Oral Q8H PRN Amanda Jacob PA-C   pravastatin 40 mg Oral Daily With Jennifer Rao MD   risperiDONE 1 mg Oral Q3H PRN Gissell Flatter, MD   traZODone 50 mg Oral HS PRN Gissell Flatter, MD   traZODone 50 mg Oral HS Gissell Flatter, MD   ziprasidone 20 mg Intramuscular Q4H PRN Gissell Flatter, MD       Risks, benefits and possible side effects of Medications:   Risks, benefits, and possible side effects of medications explained to patient and patient verbalizes understanding

## 2018-04-15 NOTE — PROGRESS NOTES
Pt calm, pleasant and brightens upon approach; remains medication compliant and visible in the milieu  Pt reports A/V hallucinations, "I only see the two men when I wake up in the mornings"  Pt also states she believes the medications are working, because the  hallucinations are less frequent and not bothersome  C/o foot and back pain 8/10 and was given prn Percocet at 11:30  Will continue to monitor

## 2018-04-16 PROCEDURE — 99232 SBSQ HOSP IP/OBS MODERATE 35: CPT | Performed by: PSYCHIATRY & NEUROLOGY

## 2018-04-16 RX ADMIN — FLUTICASONE PROPIONATE 2 SPRAY: 50 SPRAY, METERED NASAL at 08:28

## 2018-04-16 RX ADMIN — ALBUTEROL SULFATE 2 PUFF: 90 AEROSOL, METERED RESPIRATORY (INHALATION) at 04:12

## 2018-04-16 RX ADMIN — LAMOTRIGINE 200 MG: 100 TABLET ORAL at 08:28

## 2018-04-16 RX ADMIN — FLUTICASONE PROPIONATE AND SALMETEROL 1 PUFF: 50; 250 POWDER RESPIRATORY (INHALATION) at 08:28

## 2018-04-16 RX ADMIN — NYSTATIN: 100000 POWDER TOPICAL at 08:28

## 2018-04-16 RX ADMIN — CLONAZEPAM 0.75 MG: 0.5 TABLET ORAL at 21:17

## 2018-04-16 RX ADMIN — NYSTATIN 1 APPLICATION: 100000 POWDER TOPICAL at 21:15

## 2018-04-16 RX ADMIN — VITAMIN D, TAB 1000IU (100/BT) 5000 UNITS: 25 TAB at 21:17

## 2018-04-16 RX ADMIN — HYDROXYZINE HYDROCHLORIDE 25 MG: 25 TABLET, FILM COATED ORAL at 21:16

## 2018-04-16 RX ADMIN — LORATADINE 10 MG: 10 TABLET ORAL at 08:28

## 2018-04-16 RX ADMIN — GABAPENTIN 600 MG: 300 CAPSULE ORAL at 08:28

## 2018-04-16 RX ADMIN — FOLIC ACID 1 MG: 1 TABLET ORAL at 08:28

## 2018-04-16 RX ADMIN — MELATONIN TAB 3 MG 4.5 MG: 3 TAB at 21:16

## 2018-04-16 RX ADMIN — CLONAZEPAM 0.75 MG: 0.5 TABLET ORAL at 12:46

## 2018-04-16 RX ADMIN — GABAPENTIN 600 MG: 300 CAPSULE ORAL at 17:26

## 2018-04-16 RX ADMIN — ALBUTEROL SULFATE 2 PUFF: 90 AEROSOL, METERED RESPIRATORY (INHALATION) at 21:54

## 2018-04-16 RX ADMIN — OXYCODONE HYDROCHLORIDE AND ACETAMINOPHEN 1 TABLET: 5; 325 TABLET ORAL at 19:54

## 2018-04-16 RX ADMIN — HYDROXYZINE HYDROCHLORIDE 25 MG: 25 TABLET, FILM COATED ORAL at 08:28

## 2018-04-16 RX ADMIN — PRAVASTATIN SODIUM 40 MG: 40 TABLET ORAL at 17:27

## 2018-04-16 RX ADMIN — CLONAZEPAM 0.5 MG: 0.5 TABLET ORAL at 06:06

## 2018-04-16 RX ADMIN — TRAZODONE HYDROCHLORIDE 50 MG: 50 TABLET ORAL at 21:16

## 2018-04-16 RX ADMIN — NYSTATIN 1 APPLICATION: 100000 POWDER TOPICAL at 17:27

## 2018-04-16 RX ADMIN — OXYCODONE HYDROCHLORIDE AND ACETAMINOPHEN 1 TABLET: 5; 325 TABLET ORAL at 12:47

## 2018-04-16 RX ADMIN — ALBUTEROL SULFATE 2 PUFF: 90 AEROSOL, METERED RESPIRATORY (INHALATION) at 12:47

## 2018-04-16 RX ADMIN — LURASIDONE HYDROCHLORIDE 120 MG: 80 TABLET, FILM COATED ORAL at 17:27

## 2018-04-16 RX ADMIN — ALBUTEROL SULFATE 2 PUFF: 90 AEROSOL, METERED RESPIRATORY (INHALATION) at 17:53

## 2018-04-16 RX ADMIN — HYDROXYZINE HYDROCHLORIDE 25 MG: 25 TABLET, FILM COATED ORAL at 17:27

## 2018-04-16 RX ADMIN — LEVOTHYROXINE SODIUM 137 MCG: 112 TABLET ORAL at 06:06

## 2018-04-16 RX ADMIN — AMLODIPINE BESYLATE 2.5 MG: 2.5 TABLET ORAL at 08:28

## 2018-04-16 RX ADMIN — OXYCODONE HYDROCHLORIDE AND ACETAMINOPHEN 1 TABLET: 5; 325 TABLET ORAL at 04:10

## 2018-04-16 RX ADMIN — OXYBUTYNIN CHLORIDE 20 MG: 5 TABLET, FILM COATED, EXTENDED RELEASE ORAL at 08:28

## 2018-04-16 RX ADMIN — DULOXETINE HYDROCHLORIDE 120 MG: 60 CAPSULE, DELAYED RELEASE ORAL at 08:28

## 2018-04-16 RX ADMIN — LOXAPINE 75 MG: 25 CAPSULE ORAL at 21:15

## 2018-04-16 RX ADMIN — FLUTICASONE PROPIONATE AND SALMETEROL 1 PUFF: 50; 250 POWDER RESPIRATORY (INHALATION) at 17:52

## 2018-04-16 RX ADMIN — GABAPENTIN 600 MG: 300 CAPSULE ORAL at 21:17

## 2018-04-16 NOTE — PROGRESS NOTES
Spoke to Wil from [place] [facility] and set up  for this Wednesday 4/18/2018 3:30 pm   Patient will be sent via stretcher  Per Wil from ambulance service a medical necessity form will need to be attached to chart

## 2018-04-16 NOTE — PROGRESS NOTES
Called admissions department at [facility] and notified them that patient  with Charles is 4/18 at 3:30 pm  Hector Neither admissions specialist was not available so a message was left with the

## 2018-04-16 NOTE — PROGRESS NOTES
Progress Note - Behavioral Health   Kimber Negron 63 y o  female MRN: [id number]  Unit/Bed#: WS5 558-01 Encounter: [id number]    Patient seen, chart reviewed, discussed with staff  Nursing staff notes that the patient has been calm and cooperative with medications and treatment plan  The patient continue with residual auditory and visual hallucinations over the weekend  Staff also notes she has been taking her p r n  Percocet every 8 hours  Patient states that she is taking at the same why in the nursing facility  On exam this morning the patient is brighter with improved eye contact and slightly more animated  The patient states that she has not heard any voices last night or so far this morning  She states that she continues with residual visual hallucinations and states that she has been seeing one person and some shadows  Patient denies any adverse effects with her medications  She states that she does not have any depression  She continues with residual anxiety but notes that this is also improving  The patient has been compliant in attending groups      Behavior over the last 24 hours:  Slightly improved  Sleep: normal  Appetite: normal  Medication side effects: No  ROS: no complaints, chronic pain is stable with Percocet  /60 (BP Location: Left arm)   Pulse 69   Temp 97 7 °F (36 5 °C) (Tympanic)   Resp 18   Ht 5' 4" (1 626 m)   Wt (!) 141 kg (311 lb 1 6 oz)   SpO2 96%   BMI 53 40 kg/m²     Medications:   Current Facility-Administered Medications   Medication Dose Route Frequency    acetaminophen (TYLENOL) tablet 650 mg  650 mg Oral Q6H PRN    acetaminophen (TYLENOL) tablet 650 mg  650 mg Oral Q4H PRN    acetaminophen (TYLENOL) tablet 975 mg  975 mg Oral Q6H PRN    albuterol (PROVENTIL HFA,VENTOLIN HFA) inhaler 2 puff  2 puff Inhalation Q6H    aluminum-magnesium hydroxide-simethicone (MYLANTA) 200-200-20 mg/5 mL oral suspension 30 mL  30 mL Oral Q4H PRN    amLODIPine (NORVASC) tablet 2 5 mg  2 5 mg Oral Daily    benztropine (COGENTIN) injection 1 mg  1 mg Intramuscular Q1H PRN    benztropine (COGENTIN) tablet 1 mg  1 mg Oral Q1H PRN    bisacodyl (DULCOLAX) rectal suppository 10 mg  10 mg Rectal Daily PRN    cholecalciferol (VITAMIN D3) tablet 5,000 Units  5,000 Units Oral HS    clonazePAM (KlonoPIN) tablet 0 5 mg  0 5 mg Oral Early Morning    clonazePAM (KlonoPIN) tablet 0 75 mg  0 75 mg Oral HS    clonazePAM (KlonoPIN) tablet 0 75 mg  0 75 mg Oral Daily With Lunch    DULoxetine (CYMBALTA) delayed release capsule 120 mg  120 mg Oral Daily    fluticasone (FLONASE) 50 mcg/act nasal spray 2 spray  2 spray Nasal Daily    fluticasone-salmeterol (ADVAIR) 250-50 mcg/dose inhaler 1 puff  1 puff Inhalation BID    folic acid (FOLVITE) tablet 1 mg  1 mg Oral Daily    gabapentin (NEURONTIN) capsule 600 mg  600 mg Oral TID    haloperidol (HALDOL) tablet 5 mg  5 mg Oral Q6H PRN    haloperidol lactate (HALDOL) injection 5 mg  5 mg Intramuscular Q6H PRN    hydrOXYzine HCL (ATARAX) tablet 25 mg  25 mg Oral TID    lamoTRIgine (LaMICtal) tablet 200 mg  200 mg Oral Daily    levothyroxine tablet 137 mcg  137 mcg Oral Early Morning    loperamide (IMODIUM) capsule 2 mg  2 mg Oral Q4H PRN    loratadine (CLARITIN) tablet 10 mg  10 mg Oral Daily    LORazepam (ATIVAN) 2 mg/mL injection 2 mg  2 mg Intramuscular Q4H PRN    LORazepam (ATIVAN) tablet 1 mg  1 mg Oral Q6H PRN    loxapine (LOXITANE) capsule 75 mg  75 mg Oral HS    lurasidone (LATUDA) tablet 120 mg  120 mg Oral Daily With Dinner    magnesium hydroxide (MILK OF MAGNESIA) 400 mg/5 mL oral suspension 30 mL  30 mL Oral Daily PRN    melatonin tablet 4 5 mg  4 5 mg Oral HS    nystatin (MYCOSTATIN) powder   Topical TID    OLANZapine (ZyPREXA) tablet 5 mg  5 mg Oral Q3H PRN    oxybutynin (DITROPAN-XL) 24 hr tablet 20 mg  20 mg Oral Daily    oxyCODONE-acetaminophen (PERCOCET) 5-325 mg per tablet 1 tablet  1 tablet Oral Q8H PRN    pravastatin (PRAVACHOL) tablet 40 mg  40 mg Oral Daily With Dinner    risperiDONE (RisperDAL M-TABS) dispersible tablet 1 mg  1 mg Oral Q3H PRN    traZODone (DESYREL) tablet 50 mg  50 mg Oral HS PRN    traZODone (DESYREL) tablet 50 mg  50 mg Oral HS    ziprasidone (GEODON) IM injection 20 mg  20 mg Intramuscular Q4H PRN       Labs:   Admission on 04/10/2018   Component Date Value Ref Range Status    Amph/Meth UR 04/10/2018 Negative  Negative Final    Barbiturate Ur 04/10/2018 Negative  Negative Final    Benzodiazepine Urine 04/10/2018 Negative  Negative Final    Cocaine Urine 04/10/2018 Negative  Negative Final    Methadone Urine 04/10/2018 Negative  Negative Final    Opiate Urine 04/10/2018 Negative  Negative Final    PCP Ur 04/10/2018 Negative  Negative Final    THC Urine 04/10/2018 Negative  Negative Final    EXTBreath Alcohol 04/10/2018 negative   Final    Sodium 04/10/2018 139  136 - 145 mmol/L Final    Potassium 04/10/2018 4 9  3 5 - 5 3 mmol/L Final    Chloride 04/10/2018 104  100 - 108 mmol/L Final    CO2 04/10/2018 31  21 - 32 mmol/L Final    Anion Gap 04/10/2018 4  4 - 13 mmol/L Final    BUN 04/10/2018 25  5 - 25 mg/dL Final    Creatinine 04/10/2018 1 35* 0 60 - 1 30 mg/dL Final    Glucose 04/10/2018 130  65 - 140 mg/dL Final    Calcium 04/10/2018 9 2  mg/dL Final    AST 04/10/2018 12  5 - 45 U/L Final    ALT 04/10/2018 16  12 - 78 U/L Final    Alkaline Phosphatase 04/10/2018 125* 46 - 116 U/L Final    Total Protein 04/10/2018 7 1  6 4 - 8 2 g/dL Final    Albumin 04/10/2018 3 7  3 5 - 5 0 g/dL Final    Total Bilirubin 04/10/2018 0 38  0 20 - 1 00 mg/dL Final    eGFR 04/10/2018 44  ml/min/1 73sq m Final    WBC 04/10/2018 8 67  4 31 - 10 16 Thousand/uL Final    RBC 04/10/2018 4 82  3 81 - 5 12 Million/uL Final    Hemoglobin 04/10/2018 13 4  11 5 - 15 4 g/dL Final    Hematocrit 04/10/2018 42 5  34 8 - 46 1 % Final    MCV 04/10/2018 88  82 - 98 fL Final    MCH 04/10/2018 27 8 26 8 - 34 3 pg Final    MCHC 04/10/2018 31 5  31 4 - 37 4 g/dL Final    RDW 04/10/2018 14 6  11 6 - 15 1 % Final    MPV 04/10/2018 9 1  8 9 - 12 7 fL Final    Platelets 24/27/0242 177  149 - 390 Thousands/uL Final    nRBC 04/10/2018 0  /100 WBCs Final    Neutrophils Relative 04/10/2018 71  43 - 75 % Final    Lymphocytes Relative 04/10/2018 17  14 - 44 % Final    Monocytes Relative 04/10/2018 5  4 - 12 % Final    Eosinophils Relative 04/10/2018 6  0 - 6 % Final    Basophils Relative 04/10/2018 1  0 - 1 % Final    Neutrophils Absolute 04/10/2018 6 19  1 85 - 7 62 Thousands/µL Final    Lymphocytes Absolute 04/10/2018 1 47  0 60 - 4 47 Thousands/µL Final    Monocytes Absolute 04/10/2018 0 40  0 17 - 1 22 Thousand/µL Final    Eosinophils Absolute 04/10/2018 0 55  0 00 - 0 61 Thousand/µL Final    Basophils Absolute 04/10/2018 0 04  0 00 - 0 10 Thousands/µL Final    TSH 3RD GENERATON 04/10/2018 2 520  0 358 - 3 740 uIU/mL Final    Color, UA 04/10/2018 see chart   Final    Color, UA 04/10/2018 Yellow   Final    Clarity, UA 04/10/2018 Clear   Final    pH, UA 04/10/2018 7 0  4 5 - 8 0 Final    Leukocytes, UA 04/10/2018 Small* Negative Final    Nitrite, UA 04/10/2018 Negative  Negative Final    Protein, UA 04/10/2018 Negative  Negative mg/dl Final    Glucose, UA 04/10/2018 Negative  Negative mg/dl Final    Ketones, UA 04/10/2018 Negative  Negative mg/dl Final    Urobilinogen, UA 04/10/2018 0 2  0 2, 1 0 E U /dl E U /dl Final    Bilirubin, UA 04/10/2018 Negative  Negative Final    Blood, UA 04/10/2018 Negative  Negative Final    Specific Gravity, UA 04/10/2018 1 015  1 003 - 1 030 Final    RBC, UA 04/10/2018 None Seen  None Seen, 0-5 /hpf Final    WBC, UA 04/10/2018 20-30* None Seen, 0-5, 5-55, 5-65 /hpf Final    Epithelial Cells 04/10/2018 None Seen  None Seen, Occasional /hpf Final    Bacteria, UA 04/10/2018 Occasional  None Seen, Occasional /hpf Final    Hyaline Casts, UA 04/10/2018 None Seen  None Seen /lpf Final    Urine Culture 04/10/2018 6101-9848 cfu/ml Gram Negative Gil resembling Escherichia coli*  Final    Ventricular Rate 04/10/2018 71  BPM Final    Atrial Rate 04/10/2018 71  BPM Final    MD Interval 04/10/2018 222  ms Final    QRSD Interval 04/10/2018 88  ms Final    QT Interval 04/10/2018 388  ms Final    QTC Interval 04/10/2018 421  ms Final    P Axis 04/10/2018 50  degrees Final    QRS Axis 04/10/2018 22  degrees Final    T Wave Lonetree 04/10/2018 51  degrees Final    TSH 3RD GENERATON 04/11/2018 3 760* 0 358 - 3 740 uIU/mL Final    T3, Total 04/11/2018 1 00  0 60 - 1 80 ng/mL Final    T4 TOTAL 04/11/2018 10 9  4 7 - 13 3 ug/dL Final    Cholesterol 04/11/2018 187  50 - 200 mg/dL Final    Triglycerides 04/11/2018 132  <=150 mg/dL Final    HDL, Direct 04/11/2018 65* 40 - 60 mg/dL Final    LDL Calculated 04/11/2018 96  0 - 100 mg/dL Final    Non-HDL-Chol (CHOL-HDL) 04/11/2018 122  mg/dl Final    Sodium 04/11/2018 139  136 - 145 mmol/L Final    Potassium 04/11/2018 4 5  3 5 - 5 3 mmol/L Final    Chloride 04/11/2018 106  100 - 108 mmol/L Final    CO2 04/11/2018 25  21 - 32 mmol/L Final    Anion Gap 04/11/2018 8  4 - 13 mmol/L Final    BUN 04/11/2018 22  5 - 25 mg/dL Final    Creatinine 04/11/2018 1 12  0 60 - 1 30 mg/dL Final    Glucose 04/11/2018 96  65 - 140 mg/dL Final    Glucose, Fasting 04/11/2018 96  65 - 99 mg/dL Final    Calcium 04/11/2018 9 4  mg/dL Final    AST 04/11/2018 16  5 - 45 U/L Final    ALT 04/11/2018 18  12 - 78 U/L Final    Alkaline Phosphatase 04/11/2018 126* 46 - 116 U/L Final    Total Protein 04/11/2018 7 5  6 4 - 8 2 g/dL Final    Albumin 04/11/2018 3 7  3 5 - 5 0 g/dL Final    Total Bilirubin 04/11/2018 0 41  0 20 - 1 00 mg/dL Final    eGFR 04/11/2018 55  ml/min/1 73sq m Final    WBC 04/11/2018 10 11  4 31 - 10 16 Thousand/uL Final    RBC 04/11/2018 5 36* 3 81 - 5 12 Million/uL Final    Hemoglobin 04/11/2018 15 1  11 5 - 15 4 g/dL Final    Hematocrit 04/11/2018 47 3* 34 8 - 46 1 % Final    MCV 04/11/2018 88  82 - 98 fL Final    MCH 04/11/2018 28 2  26 8 - 34 3 pg Final    MCHC 04/11/2018 31 9  31 4 - 37 4 g/dL Final    RDW 04/11/2018 14 8  11 6 - 15 1 % Final    MPV 04/11/2018 11 0  8 9 - 12 7 fL Final    Platelets 47/73/7015 194  149 - 390 Thousands/uL Final    nRBC 04/11/2018 0  /100 WBCs Final    RPR 04/11/2018 Non-Reactive  Non-Reactive Final    Segmented % 04/11/2018 75  43 - 75 % Final    Lymphocytes % 04/11/2018 17  14 - 44 % Final    Monocytes % 04/11/2018 4  4 - 12 % Final    Eosinophils % 04/11/2018 4  0 - 6 % Final    Basophils % 04/11/2018 0  0 - 1 % Final    Absolute Neutrophils 04/11/2018 7 58  1 85 - 7 62 Thousand/uL Final    Lymphocytes Absolute 04/11/2018 1 72  0 60 - 4 47 Thousand/uL Final    Monocytes Absolute 04/11/2018 0 40  0 00 - 1 22 Thousand/uL Final    Eosinophils Absolute 04/11/2018 0 40  0 00 - 0 40 Thousand/uL Final    Basophils Absolute 04/11/2018 0 00  0 00 - 0 10 Thousand/uL Final    RBC Morphology 04/11/2018 Normal   Final    Platelet Estimate 34/42/3118 Adequate  Adequate Final       Mental Status Evaluation:  Appearance:  age appropriate and casually dressed   Behavior:  Calm, cooperative, good eye contact   Speech:  normal pitch and normal volume   Mood:  anxious, decreased depression   Affect:  mood-congruent and More animated   Thought Process:  goal directed   Thought Content:  No delusions voiced   Perceptual Disturbances: Visual hallucinations   Risk Potential: Suicidal Ideations none, Homicidal Ideations none and Potential for Aggression No   Sensorium:  person, place, time/date and situation   Cognition:  grossly intact   Consciousness:  alert and awake    Attention: attention span appeared shorter than expected for age   Insight:  limited   Judgment: fair   Gait/Station: normal gait/station   Motor Activity: no abnormal movements     Progress Toward Goals:  Improving and approaching baseline    Assessment/Plan   Principal Problem:    Schizoaffective disorder, bipolar type ([facility] [address] )  Active Problems:    Encounter for examination and observation for other specified reasons    Fungal dermatitis      Recommended Treatment:   Monitor with addition of routine hydroxyzine seen started yesterday for anxiety  Monitor with increase Loxitane last night to 75 mg p o  at HS  Continue with Latuda 120 mg p  o  daily  Continue with Cymbalta 120 mg total per day  Patient overall is improving and tentative discharge for Wednesday back to nursing facility if patient stabilizes further  Continue with group therapy, milieu therapy and occupational therapy  Risks, benefits and possible side effects of Medications:   Risks, benefits, and possible side effects of medications explained to patient and patient verbalizes understanding  Counseling / Coordination of Care  Total floor / unit time spent today 25 minutes  Greater than 50% of total time was spent with the patient and / or family counseling and / or coordination of care  A description of the counseling / coordination of care:  Medication management, chart review, patient interview

## 2018-04-16 NOTE — PLAN OF CARE
Problem: Ineffective Coping  Goal: Participates in unit activities  Interventions:  - Provide therapeutic environment   - Provide required programming   - Redirect inappropriate behaviors    Outcome: Progressing  Attended only one of three groups today,isolating in her room yet calm and pleasant upon approach

## 2018-04-16 NOTE — PROGRESS NOTES
Daughter, Khadra Reynoso made aware that patient will be discharged this Wednesday April 18  Patient signed IMM and is in agreement to go back to [facility]  Patient was cooperative and had a bright affect during conversation  This writer spoke to Roberto Palumbo in admissions from [facility] and she gave the phone number to call report [phone] and ask for central unit and they will take report  Marc Alarcon is the unit manager of the unit that patient will be returning to  In addition she asked that we fax the discharge instructions to [phone]  [facility] is requesting case management to call back with  time for Wednesday once ambulance has been set up

## 2018-04-16 NOTE — PROGRESS NOTES
Pt remains calm and pleasant on approach  Currently denies AH/VH  Medication compliant as well  Spent much of the evening social and among peers  C/o of generalized body aches and requested PRN percocet which she received  Will continue to monitor

## 2018-04-16 NOTE — PROGRESS NOTES
Pt calm, pleasant and brightens upon approach; remains medication compliant and social with peers/staff  Pt denies hallucinations, has less depression/anxiety and states she was ready for discharge  Pt visible in the milieu and attended group  Pt had no c/o pain this morning  Will continue to monitor

## 2018-04-16 NOTE — PLAN OF CARE
Problem: DISCHARGE PLANNING  Goal: Discharge to home or other facility with appropriate resources  INTERVENTIONS:  - Identify barriers to discharge w/patient and caregiver  - Arrange for needed discharge resources and transportation as appropriate  - Identify discharge learning needs (meds, wound care, etc )  - Arrange for interpretive services to assist at discharge as needed  - Refer to Case Management Department for coordinating discharge planning if the patient needs post-hospital services based on physician/advanced practitioner order or complex needs related to functional status, cognitive ability, or social support system   Outcome: [facility] ambulance and set up transport back to [facility] for 4/18 3:30 pm   Daughter [place] made aware of discharge date

## 2018-04-17 PROCEDURE — 99231 SBSQ HOSP IP/OBS SF/LOW 25: CPT | Performed by: PSYCHIATRY & NEUROLOGY

## 2018-04-17 RX ADMIN — GABAPENTIN 600 MG: 300 CAPSULE ORAL at 21:09

## 2018-04-17 RX ADMIN — FLUTICASONE PROPIONATE AND SALMETEROL 1 PUFF: 50; 250 POWDER RESPIRATORY (INHALATION) at 08:18

## 2018-04-17 RX ADMIN — GABAPENTIN 600 MG: 300 CAPSULE ORAL at 08:18

## 2018-04-17 RX ADMIN — HYDROXYZINE HYDROCHLORIDE 25 MG: 25 TABLET, FILM COATED ORAL at 08:18

## 2018-04-17 RX ADMIN — CLONAZEPAM 0.75 MG: 0.5 TABLET ORAL at 11:59

## 2018-04-17 RX ADMIN — LOXAPINE 75 MG: 25 CAPSULE ORAL at 21:07

## 2018-04-17 RX ADMIN — OXYCODONE HYDROCHLORIDE AND ACETAMINOPHEN 1 TABLET: 5; 325 TABLET ORAL at 16:58

## 2018-04-17 RX ADMIN — GABAPENTIN 600 MG: 300 CAPSULE ORAL at 16:59

## 2018-04-17 RX ADMIN — OXYCODONE HYDROCHLORIDE AND ACETAMINOPHEN 1 TABLET: 5; 325 TABLET ORAL at 08:19

## 2018-04-17 RX ADMIN — TRAZODONE HYDROCHLORIDE 50 MG: 50 TABLET ORAL at 21:07

## 2018-04-17 RX ADMIN — ALBUTEROL SULFATE 2 PUFF: 90 AEROSOL, METERED RESPIRATORY (INHALATION) at 21:07

## 2018-04-17 RX ADMIN — NYSTATIN: 100000 POWDER TOPICAL at 08:17

## 2018-04-17 RX ADMIN — NYSTATIN: 100000 POWDER TOPICAL at 16:57

## 2018-04-17 RX ADMIN — HYDROXYZINE HYDROCHLORIDE 25 MG: 25 TABLET, FILM COATED ORAL at 16:59

## 2018-04-17 RX ADMIN — ALBUTEROL SULFATE 2 PUFF: 90 AEROSOL, METERED RESPIRATORY (INHALATION) at 06:05

## 2018-04-17 RX ADMIN — CLONAZEPAM 0.5 MG: 0.5 TABLET ORAL at 06:06

## 2018-04-17 RX ADMIN — OXYBUTYNIN CHLORIDE 20 MG: 5 TABLET, FILM COATED, EXTENDED RELEASE ORAL at 08:18

## 2018-04-17 RX ADMIN — LAMOTRIGINE 200 MG: 100 TABLET ORAL at 08:18

## 2018-04-17 RX ADMIN — AMLODIPINE BESYLATE 2.5 MG: 2.5 TABLET ORAL at 08:18

## 2018-04-17 RX ADMIN — HYDROXYZINE HYDROCHLORIDE 25 MG: 25 TABLET, FILM COATED ORAL at 21:08

## 2018-04-17 RX ADMIN — ALBUTEROL SULFATE 2 PUFF: 90 AEROSOL, METERED RESPIRATORY (INHALATION) at 11:59

## 2018-04-17 RX ADMIN — CLONAZEPAM 0.75 MG: 0.5 TABLET ORAL at 21:10

## 2018-04-17 RX ADMIN — FLUTICASONE PROPIONATE 2 SPRAY: 50 SPRAY, METERED NASAL at 08:18

## 2018-04-17 RX ADMIN — LEVOTHYROXINE SODIUM 137 MCG: 112 TABLET ORAL at 06:06

## 2018-04-17 RX ADMIN — VITAMIN D, TAB 1000IU (100/BT) 5000 UNITS: 25 TAB at 21:08

## 2018-04-17 RX ADMIN — LURASIDONE HYDROCHLORIDE 120 MG: 80 TABLET, FILM COATED ORAL at 16:59

## 2018-04-17 RX ADMIN — ALBUTEROL SULFATE 2 PUFF: 90 AEROSOL, METERED RESPIRATORY (INHALATION) at 16:59

## 2018-04-17 RX ADMIN — FOLIC ACID 1 MG: 1 TABLET ORAL at 08:18

## 2018-04-17 RX ADMIN — NYSTATIN 1 APPLICATION: 100000 POWDER TOPICAL at 21:06

## 2018-04-17 RX ADMIN — MELATONIN TAB 3 MG 4.5 MG: 3 TAB at 21:09

## 2018-04-17 RX ADMIN — FLUTICASONE PROPIONATE AND SALMETEROL 1 PUFF: 50; 250 POWDER RESPIRATORY (INHALATION) at 17:03

## 2018-04-17 RX ADMIN — PRAVASTATIN SODIUM 40 MG: 40 TABLET ORAL at 16:59

## 2018-04-17 RX ADMIN — LORATADINE 10 MG: 10 TABLET ORAL at 08:18

## 2018-04-17 RX ADMIN — DULOXETINE HYDROCHLORIDE 120 MG: 60 CAPSULE, DELAYED RELEASE ORAL at 08:18

## 2018-04-17 NOTE — PROGRESS NOTES
Pt calm, pleasant and bright  Pt denies Si, HI and reports having had no thoughts of self harm since admission  Pt has limited interaction with peers and has been in her room this afternoon  Pt requests percocet for back pain  Pt informed medication not yet due but will be available later  Will continue to monitor

## 2018-04-17 NOTE — PROGRESS NOTES
Pt alert, calm, pleasant and bright on approach, denies SI, HI, anxiety and depression, medication compliant, attending and participating in groups  Pt c/o chronic lower back pain, PRN percocet given with effect  Pt seclusive to room unless prompted to attend groups  Pt eating and sleeping well, will continue to monitor

## 2018-04-17 NOTE — PLAN OF CARE
Problem: Ineffective Coping  Goal: Participates in unit activities  Interventions:  - Provide therapeutic environment   - Provide required programming   - Redirect inappropriate behaviors    Outcome: Progressing  Pt  Attended and focus well in all three groups today  She was able to state that she is looking forward to tent  discharge tomorrow

## 2018-04-17 NOTE — PLAN OF CARE
Problem: DISCHARGE PLANNING  Goal: Discharge to home or other facility with appropriate resources  INTERVENTIONS:  - Identify barriers to discharge w/patient and caregiver  - Arrange for needed discharge resources and transportation as appropriate  - Identify discharge learning needs (meds, wound care, etc )  - Arrange for interpretive services to assist at discharge as needed  - Refer to Case Management Department for coordinating discharge planning if the patient needs post-hospital services based on physician/advanced practitioner order or complex needs related to functional status, cognitive ability, or social support system   Outcome: Adequate for Discharge  Pt for tentative discharge tomorrow to return to [facility] ([facility] via [id number] @ 4/18/18 @ 3:30 pm    Pt will continue to receive PCP and Psychiatric Services [facility] at [facility]  Pt verbalized understanding and agreement of discharge plan of care  Pt's daughter/POA contacted and notified of tentative discharge and in agreement with Pt's return to [facility]

## 2018-04-17 NOTE — PROGRESS NOTES
Pt remains calm and pleasant  Resting in her room this evening but OOB to RN station to request pain medication  Denies S/S currently and overall medication compliant  Will monitor

## 2018-04-18 VITALS
DIASTOLIC BLOOD PRESSURE: 72 MMHG | BODY MASS INDEX: 50.02 KG/M2 | WEIGHT: 293 LBS | RESPIRATION RATE: 16 BRPM | HEIGHT: 64 IN | OXYGEN SATURATION: 95 % | SYSTOLIC BLOOD PRESSURE: 146 MMHG | HEART RATE: 82 BPM | TEMPERATURE: 97.1 F

## 2018-04-18 PROCEDURE — 99238 HOSP IP/OBS DSCHRG MGMT 30/<: CPT | Performed by: PSYCHIATRY & NEUROLOGY

## 2018-04-18 RX ORDER — CLONAZEPAM 0.5 MG/1
0.5 TABLET ORAL
Qty: 45 TABLET | Refills: 0 | Status: SHIPPED | OUTPATIENT
Start: 2018-04-18 | End: 2018-04-28

## 2018-04-18 RX ORDER — LAMOTRIGINE 200 MG/1
200 TABLET ORAL DAILY
Qty: 30 TABLET | Refills: 0 | Status: SHIPPED | OUTPATIENT
Start: 2018-04-18 | End: 2021-01-01 | Stop reason: HOSPADM

## 2018-04-18 RX ORDER — CLONAZEPAM 0.5 MG/1
TABLET ORAL
Qty: 10 TABLET | Refills: 0 | Status: SHIPPED | OUTPATIENT
Start: 2018-04-18 | End: 2021-01-01 | Stop reason: HOSPADM

## 2018-04-18 RX ORDER — DULOXETIN HYDROCHLORIDE 60 MG/1
60 CAPSULE, DELAYED RELEASE ORAL DAILY
Qty: 60 CAPSULE | Refills: 0 | Status: SHIPPED | OUTPATIENT
Start: 2018-04-19 | End: 2021-01-01 | Stop reason: HOSPADM

## 2018-04-18 RX ORDER — DULOXETIN HYDROCHLORIDE 60 MG/1
120 CAPSULE, DELAYED RELEASE ORAL DAILY
Qty: 60 CAPSULE | Refills: 0 | Status: SHIPPED | OUTPATIENT
Start: 2018-04-18 | End: 2021-01-01 | Stop reason: HOSPADM

## 2018-04-18 RX ORDER — LOXAPINE SUCCINATE 25 MG/1
75 TABLET ORAL
Qty: 30 CAPSULE | Refills: 0 | Status: SHIPPED | OUTPATIENT
Start: 2018-04-18 | End: 2021-01-01 | Stop reason: HOSPADM

## 2018-04-18 RX ADMIN — OXYBUTYNIN CHLORIDE 20 MG: 5 TABLET, FILM COATED, EXTENDED RELEASE ORAL at 08:18

## 2018-04-18 RX ADMIN — LAMOTRIGINE 200 MG: 100 TABLET ORAL at 08:18

## 2018-04-18 RX ADMIN — FLUTICASONE PROPIONATE 2 SPRAY: 50 SPRAY, METERED NASAL at 08:18

## 2018-04-18 RX ADMIN — CLONAZEPAM 0.5 MG: 0.5 TABLET ORAL at 06:23

## 2018-04-18 RX ADMIN — CLONAZEPAM 0.75 MG: 0.5 TABLET ORAL at 12:49

## 2018-04-18 RX ADMIN — AMLODIPINE BESYLATE 2.5 MG: 2.5 TABLET ORAL at 08:19

## 2018-04-18 RX ADMIN — OXYCODONE HYDROCHLORIDE AND ACETAMINOPHEN 1 TABLET: 5; 325 TABLET ORAL at 03:23

## 2018-04-18 RX ADMIN — GABAPENTIN 600 MG: 300 CAPSULE ORAL at 08:19

## 2018-04-18 RX ADMIN — FLUTICASONE PROPIONATE AND SALMETEROL 1 PUFF: 50; 250 POWDER RESPIRATORY (INHALATION) at 08:18

## 2018-04-18 RX ADMIN — FOLIC ACID 1 MG: 1 TABLET ORAL at 08:19

## 2018-04-18 RX ADMIN — ALBUTEROL SULFATE 2 PUFF: 90 AEROSOL, METERED RESPIRATORY (INHALATION) at 06:23

## 2018-04-18 RX ADMIN — LORATADINE 10 MG: 10 TABLET ORAL at 08:19

## 2018-04-18 RX ADMIN — NYSTATIN: 100000 POWDER TOPICAL at 08:18

## 2018-04-18 RX ADMIN — DULOXETINE HYDROCHLORIDE 120 MG: 60 CAPSULE, DELAYED RELEASE ORAL at 08:19

## 2018-04-18 RX ADMIN — LEVOTHYROXINE SODIUM 137 MCG: 112 TABLET ORAL at 06:23

## 2018-04-18 RX ADMIN — ALBUTEROL SULFATE 2 PUFF: 90 AEROSOL, METERED RESPIRATORY (INHALATION) at 12:50

## 2018-04-18 RX ADMIN — HYDROXYZINE HYDROCHLORIDE 25 MG: 25 TABLET, FILM COATED ORAL at 08:18

## 2018-04-18 NOTE — PROGRESS NOTES
Progress Note - Molly Schultz 27  63 y o  female MRN: [id number]  Unit/Bed#: DQ1 558-01 Encounter: [id number]    The patient was seen for continuing care and reviewed with treatment team  Pt  States she has not had any hallucinations in 48 hrs  Pt  States she is eating and sleeping well  Denies TAMEKA  She has been participating in group activities and interacting with other clients on the unit  Pt  Denies side effects from medication; exam revealed no EPS  Mental Status Evaluation:  Appearance:  Adequate hygiene and grooming   Behavior:  calm and cooperative   Fund of knowledge  Not assessed   Speech:   Language: Normal rate and Normal volume  No overt abnormality   Mood:  euthymic   Affect:   Associations: appropriate  Tightly connected   Thought Process:  Goal directed and coherent   Thought Content:  Does not verbalize delusional material   Perceptual Disturbances: Denies hallucinations and does not appear to be responding to internal stimuli   Risk Potential: No suicidal or homicidal ideation   Orientation  Oriented x 3   Memory Not tested   Attention/Concentration attention span and concentration were age appropriate   Insight:  Good insight   Judgment: Good judgment   Gait/Station: normal gait/station   Motor Activity: No abnormal movement noted     Progress Toward Goals:     Assessment/Plan    Principal Problem:    Schizoaffective disorder, bipolar type ([facility] [address] )  Active Problems:    Encounter for examination and observation for other specified reasons    Fungal dermatitis      Recommended Treatment: Continue with pharmacotherapy, group therapy, milieu therapy and occupational therapy    The patient will be maintained on the following medications:    Current Facility-Administered Medications:  acetaminophen 650 mg Oral Q6H PRN Eun Chapman MD   acetaminophen 650 mg Oral Q4H PRN Eun Chapman MD   acetaminophen 975 mg Oral Q6H PRN Eun Chapman MD   albuterol 2 puff Inhalation Q6H Ervin Manzano MD   aluminum-magnesium hydroxide-simethicone 30 mL Oral Q4H PRN Ervin Manzano MD   amLODIPine 2 5 mg Oral Daily Ervin Manzano MD   benztropine 1 mg Intramuscular Q1H PRN Ervin Manzano MD   benztropine 1 mg Oral Q1H PRN Ervin Manzano MD   bisacodyl 10 mg Rectal Daily PRN Ervin Manzano MD   cholecalciferol 5,000 Units Oral HS Ervin Manzano MD   clonazePAM 0 5 mg Oral Early Morning Ervin Manzano MD   clonazePAM 0 75 mg Oral HS Ervin Manzano MD   clonazePAM 0 75 mg Oral Daily With Lunch Ervin Manzano MD   DULoxetine 120 mg Oral Daily Ervin Manzano MD   fluticasone 2 spray Nasal Daily Ervin Manzano MD   fluticasone-salmeterol 1 puff Inhalation BID Ervin Manzano MD   folic acid 1 mg Oral Daily Ervin Manzano MD   gabapentin 600 mg Oral TID Ervin Manzano MD   haloperidol 5 mg Oral Q6H PRN Ervin Manzano MD   haloperidol lactate 5 mg Intramuscular Q6H PRN Ervin Manzano MD   hydrOXYzine HCL 25 mg Oral TID Karen Crowell PA-C   lamoTRIgine 200 mg Oral Daily Ervin Manzano MD   levothyroxine 137 mcg Oral Early Morning Ervin Manzano MD   loperamide 2 mg Oral Q4H PRN Ervin Manzano MD   loratadine 10 mg Oral Daily Ervin Manzano MD   LORazepam 2 mg Intramuscular Q4H PRN Ervin Manzano MD   LORazepam 1 mg Oral Q6H PRN Ervin Manzano MD   loxapine 75 mg Oral HS Karen Crowell PA-C   lurasidone 120 mg Oral Daily With Miguel Lara PA-C   magnesium hydroxide 30 mL Oral Daily PRN Ervin Manzano MD   melatonin 4 5 mg Oral HS Ervin Manzano MD   nystatin  Topical TID Karen Crowell PA-C   OLANZapine 5 mg Oral Q3H PRN Ervin Manzano MD   oxybutynin 20 mg Oral Daily Ervin Manzano MD   oxyCODONE-acetaminophen 1 tablet Oral Q8H PRN Tanner Peguero PA-C   pravastatin 40 mg Oral Daily With Mele Fuentes MD   risperiDONE 1 mg Oral Q3H PRN Eun Chapman MD   traZODone 50 mg Oral HS PRN Eun Chapman MD   traZODone 50 mg Oral HS Eun Chapman MD   ziprasidone 20 mg Intramuscular Q4H PRN Eun Chapman MD       Risks, benefits and possible side effects of Medications:   Risks, benefits, and possible side effects of medications explained to patient and patient verbalizes understanding

## 2018-04-18 NOTE — PLAN OF CARE
Anxiety     Anxiety is at manageable level Adequate for Discharge        Depression     Treatment Goal: Demonstrate behavioral control of depressive symptoms, verbalize feelings of improved mood/affect, and adopt new coping skills prior to discharge Adequate for Discharge        DISCHARGE PLANNING     Discharge to home or other facility with appropriate resources Adequate for Discharge        Ineffective Coping     Participates in unit activities Adequate for Discharge        Risk for Self Injury/Neglect     Treatment Goal: Remain safe during length of stay, learn and adopt new coping skills, and be free of self-injurious ideation, impulses and acts at the time of discharge Adequate for Discharge

## 2018-04-18 NOTE — NURSING NOTE
Pt discharged with belongings and paperwork via [employer]  Report called to receiving facility by prior shift

## 2018-04-18 NOTE — DISCHARGE INSTR - LAB
Contact Information: If you have any questions, concerns, pended studies, tests and/or procedures, or emergencies regarding your inpatient behavioral health visit  Please contact [facility] [phone] and ask to speak to a , nurse or physician  A contact is available 24 hours/ 7 days a week at this number  Summary of Procedures Performed During your Stay:  Below is a list of major procedures performed during your hospital stay and a summary of results:  - No major procedures performed  Pending Studies     None        If studies are pending at discharge, follow up with your PCP and/or referring provider

## 2018-04-18 NOTE — PROGRESS NOTES
Pt OOB, alert, calm, pleasant and bright on approach, denies SI, HI, anxiety and depression, medication compliant, looking forward to discharge this evening, " I am ready, today Is the day"  Pt currently visible in dayroom, laughing and talking with peers, will continue to monitor

## 2018-04-18 NOTE — PLAN OF CARE
Problem: Ineffective Coping  Goal: Participates in unit activities  Interventions:  - Provide therapeutic environment   - Provide required programming   - Redirect inappropriate behaviors    Outcome: Adequate for Discharge  Pt with bright affect,needed initial staff prompting to attend groups but did so with good focus and reports readiness for tent  Discharge later today  Pt has completed her relapse prevention plan already

## 2018-04-18 NOTE — DISCHARGE SUMMARY
Discharge Summary - [address] DAWNA Rodríguez 63 y o  female MRN: [id number]  Unit/Bed#: DG5 [facility] Encounter: [id number]     Admission Date: 4/10/2018         Discharge Date: 4/18/2018  4:10 PM    Attending Psychiatrist:   Danielle Hernandes MD  Reason for Admission/HPI:   The patient is a 63years old female who was admitted voluntarily because of depressive symptoms and auditory hallucinations telling her to harm herself  Meds/Allergies     all current active meds have been reviewed    Allergies   Allergen Reactions    Morphine Shortness Of Breath    Guaifenesin     Iodine      Other reaction(s): Other (See Comments)  contrast dye    Lexapro [Escitalopram] Hives    Other Other (See Comments)     IV DYE    Tetracycline Other (See Comments)     hives    Tylenol With Codeine #3 [Acetaminophen-Codeine] Other (See Comments)     hives    Penicillins Rash       Objective     Vital signs in last 24 hours:  Temp:  [97 1 °F (36 2 °C)] 97 1 °F (36 2 °C)  HR:  [82] 82  Resp:  [16] 16  BP: (146)/(72) 146/72    No intake or output data in the 24 hours ending 04/18/18 0103 [address] [place] [address] Course: The patient was admitted to the inpatient psychiatric unit and started on every 15 minutes precautions  During the hospitalization the patient was attending individual therapy, group therapy, milieu therapy and occupational therapy  Psychiatric medications were titrated over the hospital stay  To address depression, psychotic symptoms, auditory hallucinations and visual hallucinations the patient was started on antidepressant Cymbalta and antipsychotic medication Latuda and Loxitane  She was also prescribed clonazepam and Lamictal Medication doses were titrated during the hospital course  Prior to beginning of treatment medications risks and benefits and possible side effects t  The patient verbalized understanding and agreement for treatment  Patient's symptoms improved gradually over the hospital course   At the end of treatment the patient was doing well  Mood was stable at the time of discharge  The patient denied suicidal ideation, intent or plan at the time of discharge and denied homicidal ideation, intent or plan at the time of discharge  There was no overt psychosis at the time of discharge  Sleep and appetite were improved  The patient was tolerating medications and was not reporting any significant side effects at the time of discharge  Since the patient was doing well at the end of the hospitalization, treatment team felt that the patient could be safely discharged to outpatient care  Since she was doing well at the end of the hospitalization, treatment team felt that she could be safely discharged to outpatient care  We felt that she achieved the maximum benefit of inpatient stay at that point and could now follow up with outpatient treatment  We felt that she achieved the maximum benefit of inpatient stay at that point and could now be discharged to a lower level of care setting  The outpatient follow up with a psychiatrist was arranged by the unit  upon discharge      Mental Status at Time of Discharge:   Appearance:  Adequate hygiene and grooming and Good eye contact   Behavior:  calm, cooperative and friendly   Speech:   Language: Normal rate and Normal volume  No overt abnormality   Mood:  euthymic   Affect:   Associations: appropriate  Tightly connected   Thought Process:  Goal directed and coherent   Thought Content:  Does not verbalize delusional material   Perceptual Disturbances: Denies hallucinations and does not appear to be responding to internal stimuli     Risk Potential: No suicidal or homicidal ideation   Orientation   Language Oriented x 3  anomia No   Memory  Fund of knowledge grossly intact  Not assessed   Attention/Concentration attention span and concentration were age appropriate   Insight:  Good insight   Judgment: Good judgment   Gait/Station: normal gait/station and normal balance   Motor Activity: No abnormal movement noted       Admission Diagnosis:  Principal Problem:    Schizoaffective disorder, bipolar type ([facility] )  Active Problems:    Encounter for examination and observation for other specified reasons    Fungal dermatitis      Discharge Diagnosis:     Principal Problem:    Schizoaffective disorder, bipolar type ([facility] )  Active Problems:    Encounter for examination and observation for other specified reasons    Fungal dermatitis  Resolved Problems:    * No resolved hospital problems   *      Lab results:    Admission on 04/10/2018, Discharged on 04/18/2018   Component Date Value    Amph/Meth UR 04/10/2018 Negative     Barbiturate Ur 04/10/2018 Negative     Benzodiazepine Urine 04/10/2018 Negative     Cocaine Urine 04/10/2018 Negative     Methadone Urine 04/10/2018 Negative     Opiate Urine 04/10/2018 Negative     PCP Ur 04/10/2018 Negative     THC Urine 04/10/2018 Negative     EXTBreath Alcohol 04/10/2018 negative     Sodium 04/10/2018 139     Potassium 04/10/2018 4 9     Chloride 04/10/2018 104     CO2 04/10/2018 31     Anion Gap 04/10/2018 4     BUN 04/10/2018 25     Creatinine 04/10/2018 1 35*    Glucose 04/10/2018 130     Calcium 04/10/2018 9 2     AST 04/10/2018 12     ALT 04/10/2018 16     Alkaline Phosphatase 04/10/2018 125*    Total Protein 04/10/2018 7 1     Albumin 04/10/2018 3 7     Total Bilirubin 04/10/2018 0 38     eGFR 04/10/2018 44     WBC 04/10/2018 8 67     RBC 04/10/2018 4 82     Hemoglobin 04/10/2018 13 4     Hematocrit 04/10/2018 42 5     MCV 04/10/2018 88     MCH 04/10/2018 27 8     MCHC 04/10/2018 31 5     RDW 04/10/2018 14 6     MPV 04/10/2018 9 1     Platelets 13/17/0197 177     nRBC 04/10/2018 0     Neutrophils Relative 04/10/2018 71     Lymphocytes Relative 04/10/2018 17     Monocytes Relative 04/10/2018 5     Eosinophils Relative 04/10/2018 6     Basophils Relative 04/10/2018 1     Neutrophils Absolute 04/10/2018 6 19     Lymphocytes Absolute 04/10/2018 1 47     Monocytes Absolute 04/10/2018 0 40     Eosinophils Absolute 04/10/2018 0 55     Basophils Absolute 04/10/2018 0 04     TSH 3RD GENERATON 04/10/2018 2 520     Color, UA 04/10/2018 see chart     Color, UA 04/10/2018 Yellow     Clarity, UA 04/10/2018 Clear     pH, UA 04/10/2018 7 0     Leukocytes, UA 04/10/2018 Small*    Nitrite, UA 04/10/2018 Negative     Protein, UA 04/10/2018 Negative     Glucose, UA 04/10/2018 Negative     Ketones, UA 04/10/2018 Negative     Urobilinogen, UA 04/10/2018 0 2     Bilirubin, UA 04/10/2018 Negative     Blood, UA 04/10/2018 Negative     Specific Gravity, UA 04/10/2018 1 015     RBC, UA 04/10/2018 None Seen     WBC, UA 04/10/2018 20-30*    Epithelial Cells 04/10/2018 None Seen     Bacteria, UA 04/10/2018 Occasional     Hyaline Casts, UA 04/10/2018 None Seen     Urine Culture 04/10/2018 1574-1403 cfu/ml Gram Negative Gil resembling Escherichia coli*    Ventricular Rate 04/10/2018 71     Atrial Rate 04/10/2018 71     NJ Interval 04/10/2018 222     QRSD Interval 04/10/2018 88     QT Interval 04/10/2018 388     QTC Interval 04/10/2018 421     P Axis 04/10/2018 50     QRS Axis 04/10/2018 22     T Wave Axis 04/10/2018 51     TSH 3RD GENERATON 04/11/2018 3 760*    T3, Total 04/11/2018 1 00     T4 TOTAL 04/11/2018 10 9     Cholesterol 04/11/2018 187     Triglycerides 04/11/2018 132     HDL, Direct 04/11/2018 65*    LDL Calculated 04/11/2018 96     Non-HDL-Chol (CHOL-HDL) 04/11/2018 122     Sodium 04/11/2018 139     Potassium 04/11/2018 4 5     Chloride 04/11/2018 106     CO2 04/11/2018 25     Anion Gap 04/11/2018 8     BUN 04/11/2018 22     Creatinine 04/11/2018 1 12     Glucose 04/11/2018 96     Glucose, Fasting 04/11/2018 96     Calcium 04/11/2018 9 4     AST 04/11/2018 16     ALT 04/11/2018 18     Alkaline Phosphatase 04/11/2018 126*    Total Protein 04/11/2018 7 5     Albumin 04/11/2018 3 7     Total Bilirubin 04/11/2018 0 41     eGFR 04/11/2018 55     WBC 04/11/2018 10 11     RBC 04/11/2018 5 36*    Hemoglobin 04/11/2018 15 1     Hematocrit 04/11/2018 47 3*    MCV 04/11/2018 88     MCH 04/11/2018 28 2     MCHC 04/11/2018 31 9     RDW 04/11/2018 14 8     MPV 04/11/2018 11 0     Platelets 96/49/5206 194     nRBC 04/11/2018 0     RPR 04/11/2018 Non-Reactive     Segmented % 04/11/2018 75     Lymphocytes % 04/11/2018 17     Monocytes % 04/11/2018 4     Eosinophils % 04/11/2018 4     Basophils % 04/11/2018 0     Absolute Neutrophils 04/11/2018 7 58     Lymphocytes Absolute 04/11/2018 1 72     Monocytes Absolute 04/11/2018 0 40     Eosinophils Absolute 04/11/2018 0 40     Basophils Absolute 04/11/2018 0 00     RBC Morphology 04/11/2018 Normal     Platelet Estimate 55/63/3878 Adequate        Discharge Medications:    See after visit summary for reconciled discharge medications provided to patient and family  Discharge instructions/Information to patient and family:     See after visit summary for information provided to patient and family  Provisions for Follow-Up Care:    See after visit summary for information related to follow-up care and any pertinent home health orders  Discharge Statement     I spent 30 minutes discharging the patient  This time was spent on the day of discharge  I had direct contact with the patient on the day of discharge  Additional documentation is required if more than 30 minutes were spent on discharge:    I reviewed with Maci Perez importance of compliance with medications and outpatient treatment after discharge  I discussed the medication regimen and possible side effects of the medications with Maci Perez prior to discharge  At the time of discharge she was tolerating psychiatric medications  I discussed outpatient follow up with Maci Perez

## 2021-01-01 ENCOUNTER — APPOINTMENT (EMERGENCY)
Dept: RADIOLOGY | Facility: HOSPITAL | Age: 61
DRG: 871 | End: 2021-01-01
Payer: MEDICARE

## 2021-01-01 ENCOUNTER — HOSPITAL ENCOUNTER (INPATIENT)
Facility: HOSPITAL | Age: 61
LOS: 4 days | DRG: 871 | End: 2021-11-23
Attending: EMERGENCY MEDICINE | Admitting: INTERNAL MEDICINE
Payer: MEDICARE

## 2021-01-01 ENCOUNTER — APPOINTMENT (INPATIENT)
Dept: RADIOLOGY | Facility: HOSPITAL | Age: 61
DRG: 871 | End: 2021-01-01
Payer: MEDICARE

## 2021-01-01 ENCOUNTER — APPOINTMENT (INPATIENT)
Dept: NON INVASIVE DIAGNOSTICS | Facility: HOSPITAL | Age: 61
DRG: 871 | End: 2021-01-01
Payer: MEDICARE

## 2021-01-01 ENCOUNTER — HOSPITAL ENCOUNTER (OUTPATIENT)
Dept: MRI IMAGING | Facility: HOSPITAL | Age: 61
Discharge: HOME | End: 2021-07-05
Payer: MEDICARE

## 2021-01-01 ENCOUNTER — APPOINTMENT (EMERGENCY)
Dept: CT IMAGING | Facility: HOSPITAL | Age: 61
DRG: 871 | End: 2021-01-01
Payer: MEDICARE

## 2021-01-01 VITALS
OXYGEN SATURATION: 96 % | WEIGHT: 251 LBS | DIASTOLIC BLOOD PRESSURE: 51 MMHG | HEIGHT: 64 IN | SYSTOLIC BLOOD PRESSURE: 71 MMHG | BODY MASS INDEX: 42.85 KG/M2 | TEMPERATURE: 100.8 F

## 2021-01-01 DIAGNOSIS — M54.16 RADICULOPATHY, LUMBAR REGION: ICD-10-CM

## 2021-01-01 DIAGNOSIS — R77.8 ELEVATED TROPONIN: ICD-10-CM

## 2021-01-01 DIAGNOSIS — J96.01 ACUTE RESPIRATORY FAILURE WITH HYPOXIA (HCC): Primary | ICD-10-CM

## 2021-01-01 DIAGNOSIS — M51.26 OTHER INTERVERTEBRAL DISC DISPLACEMENT, LUMBAR REGION: ICD-10-CM

## 2021-01-01 DIAGNOSIS — J18.9 MULTIFOCAL PNEUMONIA: ICD-10-CM

## 2021-01-01 DIAGNOSIS — R79.89 D-DIMER, ELEVATED: ICD-10-CM

## 2021-01-01 LAB
2HR DELTA HS TROPONIN: 78 NG/L
4HR DELTA HS TROPONIN: 109 NG/L
ALBUMIN SERPL BCP-MCNC: 1.9 G/DL (ref 3.5–5)
ALBUMIN SERPL BCP-MCNC: 2.1 G/DL (ref 3.5–5)
ALBUMIN SERPL BCP-MCNC: 2.6 G/DL (ref 3.5–5)
ALP SERPL-CCNC: 142 U/L (ref 46–116)
ALP SERPL-CCNC: 142 U/L (ref 46–116)
ALP SERPL-CCNC: 167 U/L (ref 46–116)
ALT SERPL W P-5'-P-CCNC: 18 U/L (ref 12–78)
ALT SERPL W P-5'-P-CCNC: 24 U/L (ref 12–78)
ALT SERPL W P-5'-P-CCNC: 40 U/L (ref 12–78)
ANION GAP SERPL CALCULATED.3IONS-SCNC: 10 MMOL/L (ref 4–13)
ANION GAP SERPL CALCULATED.3IONS-SCNC: 12 MMOL/L (ref 4–13)
ANION GAP SERPL CALCULATED.3IONS-SCNC: 15 MMOL/L (ref 4–13)
ANION GAP SERPL CALCULATED.3IONS-SCNC: 7 MMOL/L (ref 4–13)
ANION GAP SERPL CALCULATED.3IONS-SCNC: 8 MMOL/L (ref 4–13)
AORTIC VALVE MEAN VELOCITY: 10.4 M/S
APTT PPP: 32 SECONDS (ref 23–37)
APTT PPP: 32 SECONDS (ref 23–37)
APTT PPP: 50 SECONDS (ref 23–37)
APTT PPP: 58 SECONDS (ref 23–37)
APTT PPP: 61 SECONDS (ref 23–37)
APTT PPP: 68 SECONDS (ref 23–37)
APTT PPP: 91 SECONDS (ref 23–37)
ARTERIAL PATENCY WRIST A: YES
ARTERIAL PATENCY WRIST A: YES
AST SERPL W P-5'-P-CCNC: 21 U/L (ref 5–45)
AST SERPL W P-5'-P-CCNC: 24 U/L (ref 5–45)
AST SERPL W P-5'-P-CCNC: 36 U/L (ref 5–45)
ATRIAL RATE: 100 BPM
ATRIAL RATE: 103 BPM
ATRIAL RATE: 143 BPM
ATRIAL RATE: 93 BPM
AV LVOT MEAN GRADIENT: 3 MMHG
AV LVOT PEAK GRADIENT: 5 MMHG
AV MEAN GRADIENT: 5 MMHG
AV PEAK GRADIENT: 9 MMHG
BACTERIA UR CULT: NORMAL
BACTERIA UR QL AUTO: ABNORMAL /HPF
BASE EXCESS BLDA CALC-SCNC: -4.8 MMOL/L
BASE EXCESS BLDA CALC-SCNC: 4.9 MMOL/L
BASOPHILS # BLD AUTO: 0.03 THOUSANDS/ΜL (ref 0–0.1)
BASOPHILS # BLD AUTO: 0.05 THOUSANDS/ΜL (ref 0–0.1)
BASOPHILS # BLD AUTO: 0.06 THOUSANDS/ΜL (ref 0–0.1)
BASOPHILS # BLD AUTO: 0.07 THOUSANDS/ΜL (ref 0–0.1)
BASOPHILS # BLD AUTO: 0.11 THOUSANDS/ΜL (ref 0–0.1)
BASOPHILS NFR BLD AUTO: 0 % (ref 0–1)
BASOPHILS NFR BLD AUTO: 1 % (ref 0–1)
BASOPHILS NFR BLD AUTO: 1 % (ref 0–1)
BILIRUB SERPL-MCNC: 0.4 MG/DL (ref 0.2–1)
BILIRUB SERPL-MCNC: 0.5 MG/DL (ref 0.2–1)
BILIRUB SERPL-MCNC: 0.6 MG/DL (ref 0.2–1)
BILIRUB UR QL STRIP: NEGATIVE
BUN SERPL-MCNC: 16 MG/DL (ref 5–25)
BUN SERPL-MCNC: 17 MG/DL (ref 5–25)
BUN SERPL-MCNC: 20 MG/DL (ref 5–25)
BUN SERPL-MCNC: 26 MG/DL (ref 5–25)
BUN SERPL-MCNC: 32 MG/DL (ref 5–25)
CALCIUM ALBUM COR SERPL-MCNC: 10.1 MG/DL (ref 8.3–10.1)
CALCIUM ALBUM COR SERPL-MCNC: 10.7 MG/DL (ref 8.3–10.1)
CALCIUM ALBUM COR SERPL-MCNC: 10.9 MG/DL (ref 8.3–10.1)
CALCIUM SERPL-MCNC: 8.6 MG/DL (ref 8.3–10.1)
CALCIUM SERPL-MCNC: 8.6 MG/DL (ref 8.3–10.1)
CALCIUM SERPL-MCNC: 9 MG/DL (ref 8.3–10.1)
CALCIUM SERPL-MCNC: 9.2 MG/DL (ref 8.3–10.1)
CALCIUM SERPL-MCNC: 9.6 MG/DL (ref 8.3–10.1)
CARDIAC TROPONIN I PNL SERPL HS: 106 NG/L
CARDIAC TROPONIN I PNL SERPL HS: 184 NG/L
CARDIAC TROPONIN I PNL SERPL HS: 215 NG/L
CARDIAC TROPONIN I PNL SERPL HS: 32 NG/L (ref 8–18)
CHLORIDE SERPL-SCNC: 102 MMOL/L (ref 100–108)
CHLORIDE SERPL-SCNC: 102 MMOL/L (ref 100–108)
CHLORIDE SERPL-SCNC: 103 MMOL/L (ref 100–108)
CHLORIDE SERPL-SCNC: 104 MMOL/L (ref 100–108)
CHLORIDE SERPL-SCNC: 98 MMOL/L (ref 100–108)
CK SERPL-CCNC: 63 U/L (ref 26–192)
CLARITY UR: ABNORMAL
CO2 SERPL-SCNC: 24 MMOL/L (ref 21–32)
CO2 SERPL-SCNC: 25 MMOL/L (ref 21–32)
CO2 SERPL-SCNC: 30 MMOL/L (ref 21–32)
CO2 SERPL-SCNC: 31 MMOL/L (ref 21–32)
CO2 SERPL-SCNC: 32 MMOL/L (ref 21–32)
COLOR UR: YELLOW
CREAT SERPL-MCNC: 1.03 MG/DL (ref 0.6–1.3)
CREAT SERPL-MCNC: 1.07 MG/DL (ref 0.6–1.3)
CREAT SERPL-MCNC: 1.11 MG/DL (ref 0.6–1.3)
CREAT SERPL-MCNC: 1.12 MG/DL (ref 0.6–1.3)
CREAT SERPL-MCNC: 1.39 MG/DL (ref 0.6–1.3)
CRP SERPL QL: 250 MG/L
D DIMER PPP FEU-MCNC: 3.74 UG/ML FEU
DOP CALC AO VTI: 20.5 CM
DOP CALC LVOT PEAK VEL VTI: 16.1 CM
DOP CALC LVOT PEAK VEL: 1.08 M/S
E WAVE DECELERATION TIME: 158 MS
EOSINOPHIL # BLD AUTO: 0 THOUSAND/ΜL (ref 0–0.61)
EOSINOPHIL # BLD AUTO: 0.04 THOUSAND/ΜL (ref 0–0.61)
EOSINOPHIL # BLD AUTO: 0.12 THOUSAND/ΜL (ref 0–0.61)
EOSINOPHIL # BLD AUTO: 0.56 THOUSAND/ΜL (ref 0–0.61)
EOSINOPHIL # BLD AUTO: 0.86 THOUSAND/ΜL (ref 0–0.61)
EOSINOPHIL NFR BLD AUTO: 0 % (ref 0–6)
EOSINOPHIL NFR BLD AUTO: 0 % (ref 0–6)
EOSINOPHIL NFR BLD AUTO: 1 % (ref 0–6)
EOSINOPHIL NFR BLD AUTO: 4 % (ref 0–6)
EOSINOPHIL NFR BLD AUTO: 5 % (ref 0–6)
ERYTHROCYTE [DISTWIDTH] IN BLOOD BY AUTOMATED COUNT: 13.7 % (ref 11.6–15.1)
ERYTHROCYTE [DISTWIDTH] IN BLOOD BY AUTOMATED COUNT: 13.9 % (ref 11.6–15.1)
ERYTHROCYTE [DISTWIDTH] IN BLOOD BY AUTOMATED COUNT: 14 % (ref 11.6–15.1)
EST. AVERAGE GLUCOSE BLD GHB EST-MCNC: 137 MG/DL
FERRITIN SERPL-MCNC: 226 NG/ML (ref 8–388)
FLUAV RNA RESP QL NAA+PROBE: NEGATIVE
FLUAV RNA RESP QL NAA+PROBE: NEGATIVE
FLUBV RNA RESP QL NAA+PROBE: NEGATIVE
FLUBV RNA RESP QL NAA+PROBE: NEGATIVE
GFR SERPL CREATININE-BSD FRML MDRD: 41 ML/MIN/1.73SQ M
GFR SERPL CREATININE-BSD FRML MDRD: 54 ML/MIN/1.73SQ M
GFR SERPL CREATININE-BSD FRML MDRD: 54 ML/MIN/1.73SQ M
GFR SERPL CREATININE-BSD FRML MDRD: 57 ML/MIN/1.73SQ M
GFR SERPL CREATININE-BSD FRML MDRD: 59 ML/MIN/1.73SQ M
GLUCOSE SERPL-MCNC: 147 MG/DL (ref 65–140)
GLUCOSE SERPL-MCNC: 150 MG/DL (ref 65–140)
GLUCOSE SERPL-MCNC: 153 MG/DL (ref 65–140)
GLUCOSE SERPL-MCNC: 166 MG/DL (ref 65–140)
GLUCOSE SERPL-MCNC: 167 MG/DL (ref 65–140)
GLUCOSE SERPL-MCNC: 168 MG/DL (ref 65–140)
GLUCOSE SERPL-MCNC: 169 MG/DL (ref 65–140)
GLUCOSE SERPL-MCNC: 181 MG/DL (ref 65–140)
GLUCOSE SERPL-MCNC: 184 MG/DL (ref 65–140)
GLUCOSE SERPL-MCNC: 186 MG/DL (ref 65–140)
GLUCOSE SERPL-MCNC: 188 MG/DL (ref 65–140)
GLUCOSE SERPL-MCNC: 198 MG/DL (ref 65–140)
GLUCOSE SERPL-MCNC: 200 MG/DL (ref 65–140)
GLUCOSE SERPL-MCNC: 254 MG/DL (ref 65–140)
GLUCOSE SERPL-MCNC: 260 MG/DL (ref 65–140)
GLUCOSE SERPL-MCNC: 281 MG/DL (ref 65–140)
GLUCOSE SERPL-MCNC: 287 MG/DL (ref 65–140)
GLUCOSE UR STRIP-MCNC: NEGATIVE MG/DL
HBA1C MFR BLD: 6.4 %
HCO3 BLDA-SCNC: 19.8 MMOL/L (ref 22–28)
HCO3 BLDA-SCNC: 29.4 MMOL/L (ref 22–28)
HCT VFR BLD AUTO: 31.7 % (ref 34.8–46.1)
HCT VFR BLD AUTO: 32.5 % (ref 34.8–46.1)
HCT VFR BLD AUTO: 33.4 % (ref 34.8–46.1)
HCT VFR BLD AUTO: 36 % (ref 34.8–46.1)
HCT VFR BLD AUTO: 37.3 % (ref 34.8–46.1)
HGB BLD-MCNC: 10.2 G/DL (ref 11.5–15.4)
HGB BLD-MCNC: 10.3 G/DL (ref 11.5–15.4)
HGB BLD-MCNC: 10.9 G/DL (ref 11.5–15.4)
HGB BLD-MCNC: 11.6 G/DL (ref 11.5–15.4)
HGB BLD-MCNC: 9.9 G/DL (ref 11.5–15.4)
HGB UR QL STRIP.AUTO: ABNORMAL
IMM GRANULOCYTES # BLD AUTO: 0.09 THOUSAND/UL (ref 0–0.2)
IMM GRANULOCYTES # BLD AUTO: 0.09 THOUSAND/UL (ref 0–0.2)
IMM GRANULOCYTES # BLD AUTO: 0.13 THOUSAND/UL (ref 0–0.2)
IMM GRANULOCYTES # BLD AUTO: 0.18 THOUSAND/UL (ref 0–0.2)
IMM GRANULOCYTES # BLD AUTO: 0.19 THOUSAND/UL (ref 0–0.2)
IMM GRANULOCYTES NFR BLD AUTO: 1 % (ref 0–2)
INR PPP: 1.09 (ref 0.84–1.19)
INR PPP: 1.15 (ref 0.84–1.19)
INR PPP: 1.2 (ref 0.84–1.19)
IPAP: 16
KETONES UR STRIP-MCNC: ABNORMAL MG/DL
L PNEUMO1 AG UR QL IA.RAPID: NEGATIVE
LACTATE SERPL-SCNC: 1.3 MMOL/L (ref 0.5–2)
LACTATE SERPL-SCNC: 2.4 MMOL/L (ref 0.5–2)
LACTATE SERPL-SCNC: 3 MMOL/L (ref 0.5–2)
LDH SERPL-CCNC: 415 U/L (ref 81–234)
LEUKOCYTE ESTERASE UR QL STRIP: ABNORMAL
LYMPHOCYTES # BLD AUTO: 0.62 THOUSANDS/ΜL (ref 0.6–4.47)
LYMPHOCYTES # BLD AUTO: 0.64 THOUSANDS/ΜL (ref 0.6–4.47)
LYMPHOCYTES # BLD AUTO: 1.1 THOUSANDS/ΜL (ref 0.6–4.47)
LYMPHOCYTES # BLD AUTO: 1.14 THOUSANDS/ΜL (ref 0.6–4.47)
LYMPHOCYTES # BLD AUTO: 1.23 THOUSANDS/ΜL (ref 0.6–4.47)
LYMPHOCYTES NFR BLD AUTO: 4 % (ref 14–44)
LYMPHOCYTES NFR BLD AUTO: 5 % (ref 14–44)
LYMPHOCYTES NFR BLD AUTO: 7 % (ref 14–44)
LYMPHOCYTES NFR BLD AUTO: 8 % (ref 14–44)
LYMPHOCYTES NFR BLD AUTO: 9 % (ref 14–44)
MAGNESIUM SERPL-MCNC: 2.3 MG/DL (ref 1.6–2.6)
MAGNESIUM SERPL-MCNC: 2.4 MG/DL (ref 1.6–2.6)
MCH RBC QN AUTO: 28.5 PG (ref 26.8–34.3)
MCH RBC QN AUTO: 28.5 PG (ref 26.8–34.3)
MCH RBC QN AUTO: 28.7 PG (ref 26.8–34.3)
MCH RBC QN AUTO: 28.7 PG (ref 26.8–34.3)
MCH RBC QN AUTO: 28.9 PG (ref 26.8–34.3)
MCHC RBC AUTO-ENTMCNC: 30.3 G/DL (ref 31.4–37.4)
MCHC RBC AUTO-ENTMCNC: 30.8 G/DL (ref 31.4–37.4)
MCHC RBC AUTO-ENTMCNC: 31.1 G/DL (ref 31.4–37.4)
MCHC RBC AUTO-ENTMCNC: 31.2 G/DL (ref 31.4–37.4)
MCHC RBC AUTO-ENTMCNC: 31.4 G/DL (ref 31.4–37.4)
MCV RBC AUTO: 92 FL (ref 82–98)
MCV RBC AUTO: 93 FL (ref 82–98)
MCV RBC AUTO: 94 FL (ref 82–98)
MONOCYTES # BLD AUTO: 0.26 THOUSAND/ΜL (ref 0.17–1.22)
MONOCYTES # BLD AUTO: 0.75 THOUSAND/ΜL (ref 0.17–1.22)
MONOCYTES # BLD AUTO: 0.84 THOUSAND/ΜL (ref 0.17–1.22)
MONOCYTES # BLD AUTO: 0.89 THOUSAND/ΜL (ref 0.17–1.22)
MONOCYTES # BLD AUTO: 0.91 THOUSAND/ΜL (ref 0.17–1.22)
MONOCYTES NFR BLD AUTO: 2 % (ref 4–12)
MONOCYTES NFR BLD AUTO: 6 % (ref 4–12)
MRSA NOSE QL CULT: ABNORMAL
MRSA NOSE QL CULT: ABNORMAL
MUCOUS THREADS UR QL AUTO: ABNORMAL
MV PEAK A VEL: 0.93 M/S
MV PEAK E VEL: 62 CM/S
MV STENOSIS PRESSURE HALF TIME: 0 MS
NEUTROPHILS # BLD AUTO: 10.79 THOUSANDS/ΜL (ref 1.85–7.62)
NEUTROPHILS # BLD AUTO: 11.14 THOUSANDS/ΜL (ref 1.85–7.62)
NEUTROPHILS # BLD AUTO: 12.58 THOUSANDS/ΜL (ref 1.85–7.62)
NEUTROPHILS # BLD AUTO: 13.21 THOUSANDS/ΜL (ref 1.85–7.62)
NEUTROPHILS # BLD AUTO: 13.39 THOUSANDS/ΜL (ref 1.85–7.62)
NEUTS SEG NFR BLD AUTO: 79 % (ref 43–75)
NEUTS SEG NFR BLD AUTO: 80 % (ref 43–75)
NEUTS SEG NFR BLD AUTO: 86 % (ref 43–75)
NEUTS SEG NFR BLD AUTO: 87 % (ref 43–75)
NEUTS SEG NFR BLD AUTO: 92 % (ref 43–75)
NITRITE UR QL STRIP: NEGATIVE
NON VENT- BIPAP: ABNORMAL
NON-SQ EPI CELLS URNS QL MICRO: ABNORMAL /HPF
NRBC BLD AUTO-RTO: 0 /100 WBCS
NT-PROBNP SERPL-MCNC: 2171 PG/ML
O2 CT BLDA-SCNC: 15.1 ML/DL (ref 16–23)
O2 CT BLDA-SCNC: 15.7 ML/DL (ref 16–23)
OXYHGB MFR BLDA: 94.4 % (ref 94–97)
OXYHGB MFR BLDA: 95.7 % (ref 94–97)
P AXIS: 24 DEGREES
P AXIS: 50 DEGREES
PCO2 BLDA: 35 MM HG (ref 36–44)
PCO2 BLDA: 43.4 MM HG (ref 36–44)
PEEP MAX SETTING VENT: 8 CM[H2O]
PH BLDA: 7.37 [PH] (ref 7.35–7.45)
PH BLDA: 7.45 [PH] (ref 7.35–7.45)
PH UR STRIP.AUTO: 6 [PH]
PHOSPHATE SERPL-MCNC: 2.4 MG/DL (ref 2.3–4.1)
PHOSPHATE SERPL-MCNC: 2.7 MG/DL (ref 2.3–4.1)
PLATELET # BLD AUTO: 251 THOUSANDS/UL (ref 149–390)
PLATELET # BLD AUTO: 263 THOUSANDS/UL (ref 149–390)
PLATELET # BLD AUTO: 273 THOUSANDS/UL (ref 149–390)
PLATELET # BLD AUTO: 275 THOUSANDS/UL (ref 149–390)
PLATELET # BLD AUTO: 301 THOUSANDS/UL (ref 149–390)
PLATELET # BLD AUTO: 303 THOUSANDS/UL (ref 149–390)
PMV BLD AUTO: 9 FL (ref 8.9–12.7)
PMV BLD AUTO: 9.2 FL (ref 8.9–12.7)
PMV BLD AUTO: 9.3 FL (ref 8.9–12.7)
PMV BLD AUTO: 9.5 FL (ref 8.9–12.7)
PO2 BLDA: 96.7 MM HG (ref 75–129)
PO2 BLDA: 99.2 MM HG (ref 75–129)
POTASSIUM SERPL-SCNC: 3.6 MMOL/L (ref 3.5–5.3)
POTASSIUM SERPL-SCNC: 4 MMOL/L (ref 3.5–5.3)
POTASSIUM SERPL-SCNC: 4.1 MMOL/L (ref 3.5–5.3)
POTASSIUM SERPL-SCNC: 4.1 MMOL/L (ref 3.5–5.3)
POTASSIUM SERPL-SCNC: 4.5 MMOL/L (ref 3.5–5.3)
PR INTERVAL: 132 MS
PR INTERVAL: 170 MS
PR INTERVAL: 172 MS
PR INTERVAL: 184 MS
PROCALCITONIN SERPL-MCNC: 0.1 NG/ML
PROCALCITONIN SERPL-MCNC: 0.45 NG/ML
PROCALCITONIN SERPL-MCNC: 0.48 NG/ML
PROCALCITONIN SERPL-MCNC: 0.92 NG/ML
PROCALCITONIN SERPL-MCNC: 0.96 NG/ML
PROT SERPL-MCNC: 6.4 G/DL (ref 6.4–8.2)
PROT SERPL-MCNC: 6.7 G/DL (ref 6.4–8.2)
PROT SERPL-MCNC: 7.3 G/DL (ref 6.4–8.2)
PROT UR STRIP-MCNC: ABNORMAL MG/DL
PROTHROMBIN TIME: 14 SECONDS (ref 11.6–14.5)
PROTHROMBIN TIME: 14.6 SECONDS (ref 11.6–14.5)
PROTHROMBIN TIME: 15 SECONDS (ref 11.6–14.5)
QRS AXIS: -1 DEGREES
QRS AXIS: -24 DEGREES
QRS AXIS: 196 DEGREES
QRS AXIS: 219 DEGREES
QRSD INTERVAL: 76 MS
QRSD INTERVAL: 84 MS
QRSD INTERVAL: 86 MS
QRSD INTERVAL: 92 MS
QT INTERVAL: 292 MS
QT INTERVAL: 362 MS
QT INTERVAL: 364 MS
QT INTERVAL: 378 MS
QTC INTERVAL: 450 MS
QTC INTERVAL: 469 MS
QTC INTERVAL: 469 MS
QTC INTERVAL: 474 MS
RA PRESSURE ESTIMATED: 3 MMHG
RBC # BLD AUTO: 3.45 MILLION/UL (ref 3.81–5.12)
RBC # BLD AUTO: 3.53 MILLION/UL (ref 3.81–5.12)
RBC # BLD AUTO: 3.61 MILLION/UL (ref 3.81–5.12)
RBC # BLD AUTO: 3.82 MILLION/UL (ref 3.81–5.12)
RBC # BLD AUTO: 4.04 MILLION/UL (ref 3.81–5.12)
RBC #/AREA URNS AUTO: ABNORMAL /HPF
RSV RNA RESP QL NAA+PROBE: NEGATIVE
RSV RNA RESP QL NAA+PROBE: NEGATIVE
S PNEUM AG UR QL: NEGATIVE
SARS-COV-2 IGG SERPL QL IA: REACTIVE
SARS-COV-2 IGG+IGM SERPL QL IA: REACTIVE
SARS-COV-2 RNA RESP QL NAA+PROBE: NEGATIVE
SARS-COV-2 RNA RESP QL NAA+PROBE: NEGATIVE
SL CV LV EF: 65
SODIUM SERPL-SCNC: 138 MMOL/L (ref 136–145)
SODIUM SERPL-SCNC: 139 MMOL/L (ref 136–145)
SODIUM SERPL-SCNC: 139 MMOL/L (ref 136–145)
SODIUM SERPL-SCNC: 142 MMOL/L (ref 136–145)
SODIUM SERPL-SCNC: 145 MMOL/L (ref 136–145)
SP GR UR STRIP.AUTO: >=1.03 (ref 1–1.03)
SPECIMEN SOURCE: ABNORMAL
SPECIMEN SOURCE: ABNORMAL
T WAVE AXIS: -3 DEGREES
T WAVE AXIS: 144 DEGREES
T WAVE AXIS: 145 DEGREES
T WAVE AXIS: 24 DEGREES
TRICUSPID VALVE S': 1.1 CM/S
TSH SERPL DL<=0.05 MIU/L-ACNC: 1.11 UIU/ML (ref 0.36–3.74)
UROBILINOGEN UR QL STRIP.AUTO: 0.2 E.U./DL
VANCOMYCIN TROUGH SERPL-MCNC: 23 UG/ML (ref 10–20)
VENT BIPAP FIO2: 100 %
VENTRICULAR RATE: 100 BPM
VENTRICULAR RATE: 103 BPM
VENTRICULAR RATE: 143 BPM
VENTRICULAR RATE: 93 BPM
WBC # BLD AUTO: 12.14 THOUSAND/UL (ref 4.31–10.16)
WBC # BLD AUTO: 13.43 THOUSAND/UL (ref 4.31–10.16)
WBC # BLD AUTO: 15.25 THOUSAND/UL (ref 4.31–10.16)
WBC # BLD AUTO: 15.4 THOUSAND/UL (ref 4.31–10.16)
WBC # BLD AUTO: 15.85 THOUSAND/UL (ref 4.31–10.16)
WBC #/AREA URNS AUTO: ABNORMAL /HPF

## 2021-01-01 PROCEDURE — 82550 ASSAY OF CK (CPK): CPT | Performed by: NURSE PRACTITIONER

## 2021-01-01 PROCEDURE — 94760 N-INVAS EAR/PLS OXIMETRY 1: CPT

## 2021-01-01 PROCEDURE — 83735 ASSAY OF MAGNESIUM: CPT | Performed by: NURSE PRACTITIONER

## 2021-01-01 PROCEDURE — 80053 COMPREHEN METABOLIC PANEL: CPT | Performed by: NURSE PRACTITIONER

## 2021-01-01 PROCEDURE — 92610 EVALUATE SWALLOWING FUNCTION: CPT

## 2021-01-01 PROCEDURE — 85379 FIBRIN DEGRADATION QUANT: CPT | Performed by: NURSE PRACTITIONER

## 2021-01-01 PROCEDURE — 99291 CRITICAL CARE FIRST HOUR: CPT | Performed by: INTERNAL MEDICINE

## 2021-01-01 PROCEDURE — 99223 1ST HOSP IP/OBS HIGH 75: CPT | Performed by: INTERNAL MEDICINE

## 2021-01-01 PROCEDURE — 94640 AIRWAY INHALATION TREATMENT: CPT

## 2021-01-01 PROCEDURE — 84443 ASSAY THYROID STIM HORMONE: CPT | Performed by: NURSE PRACTITIONER

## 2021-01-01 PROCEDURE — 94003 VENT MGMT INPAT SUBQ DAY: CPT

## 2021-01-01 PROCEDURE — 93970 EXTREMITY STUDY: CPT

## 2021-01-01 PROCEDURE — 99291 CRITICAL CARE FIRST HOUR: CPT

## 2021-01-01 PROCEDURE — 85610 PROTHROMBIN TIME: CPT | Performed by: NURSE PRACTITIONER

## 2021-01-01 PROCEDURE — 93010 ELECTROCARDIOGRAM REPORT: CPT | Performed by: INTERNAL MEDICINE

## 2021-01-01 PROCEDURE — 80202 ASSAY OF VANCOMYCIN: CPT | Performed by: INTERNAL MEDICINE

## 2021-01-01 PROCEDURE — 87040 BLOOD CULTURE FOR BACTERIA: CPT | Performed by: EMERGENCY MEDICINE

## 2021-01-01 PROCEDURE — 84484 ASSAY OF TROPONIN QUANT: CPT | Performed by: EMERGENCY MEDICINE

## 2021-01-01 PROCEDURE — 36415 COLL VENOUS BLD VENIPUNCTURE: CPT | Performed by: EMERGENCY MEDICINE

## 2021-01-01 PROCEDURE — 85730 THROMBOPLASTIN TIME PARTIAL: CPT | Performed by: NURSE PRACTITIONER

## 2021-01-01 PROCEDURE — 85025 COMPLETE CBC W/AUTO DIFF WBC: CPT | Performed by: NURSE PRACTITIONER

## 2021-01-01 PROCEDURE — 85025 COMPLETE CBC W/AUTO DIFF WBC: CPT | Performed by: EMERGENCY MEDICINE

## 2021-01-01 PROCEDURE — 80053 COMPREHEN METABOLIC PANEL: CPT | Performed by: EMERGENCY MEDICINE

## 2021-01-01 PROCEDURE — 82805 BLOOD GASES W/O2 SATURATION: CPT | Performed by: EMERGENCY MEDICINE

## 2021-01-01 PROCEDURE — 82948 REAGENT STRIP/BLOOD GLUCOSE: CPT

## 2021-01-01 PROCEDURE — 84484 ASSAY OF TROPONIN QUANT: CPT | Performed by: NURSE PRACTITIONER

## 2021-01-01 PROCEDURE — 72141 MRI NECK SPINE W/O DYE: CPT

## 2021-01-01 PROCEDURE — 84145 PROCALCITONIN (PCT): CPT | Performed by: EMERGENCY MEDICINE

## 2021-01-01 PROCEDURE — 80048 BASIC METABOLIC PNL TOTAL CA: CPT | Performed by: NURSE PRACTITIONER

## 2021-01-01 PROCEDURE — 99232 SBSQ HOSP IP/OBS MODERATE 35: CPT | Performed by: INTERNAL MEDICINE

## 2021-01-01 PROCEDURE — C8929 TTE W OR WO FOL WCON,DOPPLER: HCPCS

## 2021-01-01 PROCEDURE — 84484 ASSAY OF TROPONIN QUANT: CPT | Performed by: PHYSICIAN ASSISTANT

## 2021-01-01 PROCEDURE — 36600 WITHDRAWAL OF ARTERIAL BLOOD: CPT

## 2021-01-01 PROCEDURE — 94664 DEMO&/EVAL PT USE INHALER: CPT

## 2021-01-01 PROCEDURE — 85610 PROTHROMBIN TIME: CPT | Performed by: EMERGENCY MEDICINE

## 2021-01-01 PROCEDURE — 96375 TX/PRO/DX INJ NEW DRUG ADDON: CPT

## 2021-01-01 PROCEDURE — 83880 ASSAY OF NATRIURETIC PEPTIDE: CPT | Performed by: EMERGENCY MEDICINE

## 2021-01-01 PROCEDURE — 84100 ASSAY OF PHOSPHORUS: CPT | Performed by: NURSE PRACTITIONER

## 2021-01-01 PROCEDURE — 96367 TX/PROPH/DG ADDL SEQ IV INF: CPT

## 2021-01-01 PROCEDURE — 72148 MRI LUMBAR SPINE W/O DYE: CPT

## 2021-01-01 PROCEDURE — 87086 URINE CULTURE/COLONY COUNT: CPT | Performed by: EMERGENCY MEDICINE

## 2021-01-01 PROCEDURE — 83036 HEMOGLOBIN GLYCOSYLATED A1C: CPT | Performed by: NURSE PRACTITIONER

## 2021-01-01 PROCEDURE — 83615 LACTATE (LD) (LDH) ENZYME: CPT | Performed by: NURSE PRACTITIONER

## 2021-01-01 PROCEDURE — 84145 PROCALCITONIN (PCT): CPT | Performed by: NURSE PRACTITIONER

## 2021-01-01 PROCEDURE — 99291 CRITICAL CARE FIRST HOUR: CPT | Performed by: EMERGENCY MEDICINE

## 2021-01-01 PROCEDURE — 71045 X-RAY EXAM CHEST 1 VIEW: CPT

## 2021-01-01 PROCEDURE — 0241U HB NFCT DS VIR RESP RNA 4 TRGT: CPT | Performed by: EMERGENCY MEDICINE

## 2021-01-01 PROCEDURE — 85730 THROMBOPLASTIN TIME PARTIAL: CPT | Performed by: PHYSICIAN ASSISTANT

## 2021-01-01 PROCEDURE — 99292 CRITICAL CARE ADDL 30 MIN: CPT | Performed by: EMERGENCY MEDICINE

## 2021-01-01 PROCEDURE — 94002 VENT MGMT INPAT INIT DAY: CPT

## 2021-01-01 PROCEDURE — 87040 BLOOD CULTURE FOR BACTERIA: CPT | Performed by: NURSE PRACTITIONER

## 2021-01-01 PROCEDURE — NC001 PR NO CHARGE: Performed by: NURSE PRACTITIONER

## 2021-01-01 PROCEDURE — 93005 ELECTROCARDIOGRAM TRACING: CPT

## 2021-01-01 PROCEDURE — G1004 CDSM NDSC: HCPCS

## 2021-01-01 PROCEDURE — 86140 C-REACTIVE PROTEIN: CPT | Performed by: NURSE PRACTITIONER

## 2021-01-01 PROCEDURE — 83605 ASSAY OF LACTIC ACID: CPT | Performed by: EMERGENCY MEDICINE

## 2021-01-01 PROCEDURE — 99291 CRITICAL CARE FIRST HOUR: CPT | Performed by: NURSE PRACTITIONER

## 2021-01-01 PROCEDURE — 85049 AUTOMATED PLATELET COUNT: CPT | Performed by: NURSE PRACTITIONER

## 2021-01-01 PROCEDURE — 85730 THROMBOPLASTIN TIME PARTIAL: CPT | Performed by: EMERGENCY MEDICINE

## 2021-01-01 PROCEDURE — 83605 ASSAY OF LACTIC ACID: CPT | Performed by: NURSE PRACTITIONER

## 2021-01-01 PROCEDURE — 86769 SARS-COV-2 COVID-19 ANTIBODY: CPT | Performed by: NURSE PRACTITIONER

## 2021-01-01 PROCEDURE — C9113 INJ PANTOPRAZOLE SODIUM, VIA: HCPCS | Performed by: NURSE PRACTITIONER

## 2021-01-01 PROCEDURE — 82805 BLOOD GASES W/O2 SATURATION: CPT | Performed by: PHYSICIAN ASSISTANT

## 2021-01-01 PROCEDURE — 71250 CT THORAX DX C-: CPT

## 2021-01-01 PROCEDURE — 96365 THER/PROPH/DIAG IV INF INIT: CPT

## 2021-01-01 PROCEDURE — 82728 ASSAY OF FERRITIN: CPT | Performed by: NURSE PRACTITIONER

## 2021-01-01 PROCEDURE — 87449 NOS EACH ORGANISM AG IA: CPT | Performed by: NURSE PRACTITIONER

## 2021-01-01 PROCEDURE — 96368 THER/DIAG CONCURRENT INF: CPT

## 2021-01-01 PROCEDURE — 87081 CULTURE SCREEN ONLY: CPT | Performed by: NURSE PRACTITIONER

## 2021-01-01 PROCEDURE — 81001 URINALYSIS AUTO W/SCOPE: CPT | Performed by: EMERGENCY MEDICINE

## 2021-01-01 PROCEDURE — 84145 PROCALCITONIN (PCT): CPT | Performed by: PHYSICIAN ASSISTANT

## 2021-01-01 PROCEDURE — NC001 PR NO CHARGE: Performed by: INTERNAL MEDICINE

## 2021-01-01 PROCEDURE — 93306 TTE W/DOPPLER COMPLETE: CPT | Performed by: INTERNAL MEDICINE

## 2021-01-01 RX ORDER — GABAPENTIN 300 MG/1
600 CAPSULE ORAL 3 TIMES DAILY
Status: DISCONTINUED | OUTPATIENT
Start: 2021-01-01 | End: 2021-01-01

## 2021-01-01 RX ORDER — HEPARIN SODIUM 1000 [USP'U]/ML
4000 INJECTION, SOLUTION INTRAVENOUS; SUBCUTANEOUS ONCE
Status: COMPLETED | OUTPATIENT
Start: 2021-01-01 | End: 2021-01-01

## 2021-01-01 RX ORDER — CALCIUM CARBONATE 200(500)MG
2 TABLET,CHEWABLE ORAL DAILY
COMMUNITY
End: 2021-01-01 | Stop reason: HOSPADM

## 2021-01-01 RX ORDER — GABAPENTIN 300 MG/1
300 CAPSULE ORAL 3 TIMES DAILY
Status: DISCONTINUED | OUTPATIENT
Start: 2021-01-01 | End: 2021-01-01

## 2021-01-01 RX ORDER — HEPARIN SODIUM 1000 [USP'U]/ML
2000 INJECTION, SOLUTION INTRAVENOUS; SUBCUTANEOUS
Status: DISCONTINUED | OUTPATIENT
Start: 2021-01-01 | End: 2021-01-01

## 2021-01-01 RX ORDER — HYDROMORPHONE HCL/PF 1 MG/ML
1 SYRINGE (ML) INJECTION ONCE
Status: COMPLETED | OUTPATIENT
Start: 2021-01-01 | End: 2021-01-01

## 2021-01-01 RX ORDER — IPRATROPIUM BROMIDE AND ALBUTEROL SULFATE .5; 3 MG/3ML; MG/3ML
1 SOLUTION RESPIRATORY (INHALATION) ONCE
Status: COMPLETED | OUTPATIENT
Start: 2021-01-01 | End: 2021-01-01

## 2021-01-01 RX ORDER — FUROSEMIDE 10 MG/ML
40 INJECTION INTRAMUSCULAR; INTRAVENOUS ONCE
Status: COMPLETED | OUTPATIENT
Start: 2021-01-01 | End: 2021-01-01

## 2021-01-01 RX ORDER — ASPIRIN 81 MG/1
81 TABLET, CHEWABLE ORAL DAILY
Status: DISCONTINUED | OUTPATIENT
Start: 2021-01-01 | End: 2021-01-01

## 2021-01-01 RX ORDER — IPRATROPIUM BROMIDE 21 UG/1
2 SPRAY, METERED NASAL EVERY 12 HOURS
COMMUNITY
End: 2021-01-01 | Stop reason: HOSPADM

## 2021-01-01 RX ORDER — LEVALBUTEROL 1.25 MG/.5ML
1.25 SOLUTION, CONCENTRATE RESPIRATORY (INHALATION)
Status: DISCONTINUED | OUTPATIENT
Start: 2021-01-01 | End: 2021-01-01

## 2021-01-01 RX ORDER — LAMOTRIGINE 100 MG/1
200 TABLET ORAL DAILY
Status: DISCONTINUED | OUTPATIENT
Start: 2021-01-01 | End: 2021-01-01

## 2021-01-01 RX ORDER — ACETAMINOPHEN 325 MG/1
650 TABLET ORAL 2 TIMES DAILY
COMMUNITY
End: 2021-01-01 | Stop reason: HOSPADM

## 2021-01-01 RX ORDER — LEVOFLOXACIN 500 MG/1
500 TABLET, FILM COATED ORAL EVERY 24 HOURS
COMMUNITY
Start: 2021-01-01 | End: 2021-01-01 | Stop reason: HOSPADM

## 2021-01-01 RX ORDER — ACETAMINOPHEN 325 MG/1
650 TABLET ORAL EVERY 6 HOURS PRN
Status: DISCONTINUED | OUTPATIENT
Start: 2021-01-01 | End: 2021-01-01

## 2021-01-01 RX ORDER — ACETAMINOPHEN 650 MG/1
650 SUPPOSITORY RECTAL EVERY 4 HOURS PRN
Status: DISCONTINUED | OUTPATIENT
Start: 2021-01-01 | End: 2021-01-01 | Stop reason: HOSPADM

## 2021-01-01 RX ORDER — PRAVASTATIN SODIUM 40 MG
40 TABLET ORAL
Status: DISCONTINUED | OUTPATIENT
Start: 2021-01-01 | End: 2021-01-01

## 2021-01-01 RX ORDER — QUETIAPINE FUMARATE 100 MG/1
400 TABLET, FILM COATED ORAL
Status: DISCONTINUED | OUTPATIENT
Start: 2021-01-01 | End: 2021-01-01

## 2021-01-01 RX ORDER — POTASSIUM CHLORIDE 20 MEQ/1
40 TABLET, EXTENDED RELEASE ORAL ONCE
Status: COMPLETED | OUTPATIENT
Start: 2021-01-01 | End: 2021-01-01

## 2021-01-01 RX ORDER — HYDROMORPHONE HCL/PF 1 MG/ML
1 SYRINGE (ML) INJECTION EVERY 2 HOUR PRN
Status: DISCONTINUED | OUTPATIENT
Start: 2021-01-01 | End: 2021-01-01 | Stop reason: HOSPADM

## 2021-01-01 RX ORDER — HEPARIN SODIUM 1000 [USP'U]/ML
4000 INJECTION, SOLUTION INTRAVENOUS; SUBCUTANEOUS
Status: DISCONTINUED | OUTPATIENT
Start: 2021-01-01 | End: 2021-01-01

## 2021-01-01 RX ORDER — QUETIAPINE FUMARATE 400 MG/1
400 TABLET, FILM COATED ORAL
COMMUNITY
End: 2021-01-01 | Stop reason: HOSPADM

## 2021-01-01 RX ORDER — CEFEPIME HYDROCHLORIDE 2 G/50ML
2000 INJECTION, SOLUTION INTRAVENOUS ONCE
Status: COMPLETED | OUTPATIENT
Start: 2021-01-01 | End: 2021-01-01

## 2021-01-01 RX ORDER — HYDROXYZINE HYDROCHLORIDE 25 MG/1
50 TABLET, FILM COATED ORAL EVERY 6 HOURS PRN
Status: DISCONTINUED | OUTPATIENT
Start: 2021-01-01 | End: 2021-01-01

## 2021-01-01 RX ORDER — ASPIRIN 300 MG/1
300 SUPPOSITORY RECTAL ONCE
Status: COMPLETED | OUTPATIENT
Start: 2021-01-01 | End: 2021-01-01

## 2021-01-01 RX ORDER — CLONAZEPAM 1 MG/1
1 TABLET ORAL 3 TIMES DAILY
Status: DISCONTINUED | OUTPATIENT
Start: 2021-01-01 | End: 2021-01-01

## 2021-01-01 RX ORDER — XYLITOL/YERBA SANTA
5 AEROSOL, SPRAY WITH PUMP (ML) MUCOUS MEMBRANE 4 TIMES DAILY PRN
Status: DISCONTINUED | OUTPATIENT
Start: 2021-01-01 | End: 2021-01-01

## 2021-01-01 RX ORDER — CEFEPIME HYDROCHLORIDE 2 G/50ML
2000 INJECTION, SOLUTION INTRAVENOUS EVERY 12 HOURS
Status: DISCONTINUED | OUTPATIENT
Start: 2021-01-01 | End: 2021-01-01

## 2021-01-01 RX ORDER — INSULIN GLARGINE 100 [IU]/ML
5 INJECTION, SOLUTION SUBCUTANEOUS
Status: DISCONTINUED | OUTPATIENT
Start: 2021-01-01 | End: 2021-01-01

## 2021-01-01 RX ORDER — TRAMADOL HYDROCHLORIDE 50 MG/1
50 TABLET ORAL 2 TIMES DAILY PRN
COMMUNITY
End: 2021-01-01 | Stop reason: HOSPADM

## 2021-01-01 RX ORDER — METHYLPREDNISOLONE SODIUM SUCCINATE 125 MG/2ML
125 INJECTION, POWDER, LYOPHILIZED, FOR SOLUTION INTRAMUSCULAR; INTRAVENOUS ONCE
Status: COMPLETED | OUTPATIENT
Start: 2021-01-01 | End: 2021-01-01

## 2021-01-01 RX ORDER — CEFTRIAXONE 2 G/50ML
2000 INJECTION, SOLUTION INTRAVENOUS ONCE
Status: DISCONTINUED | OUTPATIENT
Start: 2021-01-01 | End: 2021-01-01

## 2021-01-01 RX ORDER — HYDROXYZINE HYDROCHLORIDE 25 MG/1
50 TABLET, FILM COATED ORAL EVERY 6 HOURS
Status: DISCONTINUED | OUTPATIENT
Start: 2021-01-01 | End: 2021-01-01

## 2021-01-01 RX ORDER — OXYBUTYNIN CHLORIDE 5 MG/1
15 TABLET, EXTENDED RELEASE ORAL DAILY
Status: DISCONTINUED | OUTPATIENT
Start: 2021-01-01 | End: 2021-01-01

## 2021-01-01 RX ORDER — MAGNESIUM SULFATE HEPTAHYDRATE 40 MG/ML
2 INJECTION, SOLUTION INTRAVENOUS ONCE
Status: COMPLETED | OUTPATIENT
Start: 2021-01-01 | End: 2021-01-01

## 2021-01-01 RX ORDER — DULOXETIN HYDROCHLORIDE 30 MG/1
60 CAPSULE, DELAYED RELEASE ORAL DAILY
Status: DISCONTINUED | OUTPATIENT
Start: 2021-01-01 | End: 2021-01-01

## 2021-01-01 RX ORDER — LEVOTHYROXINE SODIUM 0.07 MG/1
150 TABLET ORAL
Status: DISCONTINUED | OUTPATIENT
Start: 2021-01-01 | End: 2021-01-01

## 2021-01-01 RX ORDER — HEPARIN SODIUM 10000 [USP'U]/100ML
3-20 INJECTION, SOLUTION INTRAVENOUS
Status: DISCONTINUED | OUTPATIENT
Start: 2021-01-01 | End: 2021-01-01

## 2021-01-01 RX ORDER — PANTOPRAZOLE SODIUM 40 MG/1
40 INJECTION, POWDER, FOR SOLUTION INTRAVENOUS DAILY
Status: DISCONTINUED | OUTPATIENT
Start: 2021-01-01 | End: 2021-01-01

## 2021-01-01 RX ORDER — HEPARIN SODIUM 5000 [USP'U]/ML
5000 INJECTION, SOLUTION INTRAVENOUS; SUBCUTANEOUS EVERY 8 HOURS SCHEDULED
Status: DISCONTINUED | OUTPATIENT
Start: 2021-01-01 | End: 2021-01-01 | Stop reason: ALTCHOICE

## 2021-01-01 RX ORDER — LORAZEPAM 2 MG/ML
0.5 INJECTION INTRAMUSCULAR ONCE
Status: COMPLETED | OUTPATIENT
Start: 2021-01-01 | End: 2021-01-01

## 2021-01-01 RX ORDER — AMLODIPINE BESYLATE 2.5 MG/1
2.5 TABLET ORAL DAILY
Status: DISCONTINUED | OUTPATIENT
Start: 2021-01-01 | End: 2021-01-01

## 2021-01-01 RX ORDER — DIPHENHYDRAMINE HYDROCHLORIDE 50 MG/ML
25 INJECTION INTRAMUSCULAR; INTRAVENOUS ONCE
Status: COMPLETED | OUTPATIENT
Start: 2021-01-01 | End: 2021-01-01

## 2021-01-01 RX ORDER — MINERAL OIL AND PETROLATUM 150; 830 MG/G; MG/G
1 OINTMENT OPHTHALMIC
COMMUNITY
End: 2021-01-01 | Stop reason: HOSPADM

## 2021-01-01 RX ADMIN — IPRATROPIUM BROMIDE 0.5 MG: 0.5 SOLUTION RESPIRATORY (INHALATION) at 19:23

## 2021-01-01 RX ADMIN — INSULIN LISPRO 2 UNITS: 100 INJECTION, SOLUTION INTRAVENOUS; SUBCUTANEOUS at 05:49

## 2021-01-01 RX ADMIN — INSULIN GLARGINE 5 UNITS: 100 INJECTION, SOLUTION SUBCUTANEOUS at 21:12

## 2021-01-01 RX ADMIN — IPRATROPIUM BROMIDE 0.5 MG: 0.5 SOLUTION RESPIRATORY (INHALATION) at 07:43

## 2021-01-01 RX ADMIN — HEPARIN SODIUM 11.1 UNITS/KG/HR: 10000 INJECTION, SOLUTION INTRAVENOUS at 13:04

## 2021-01-01 RX ADMIN — VANCOMYCIN HYDROCHLORIDE 1250 MG: 1 INJECTION, POWDER, LYOPHILIZED, FOR SOLUTION INTRAVENOUS at 09:17

## 2021-01-01 RX ADMIN — CEFEPIME HYDROCHLORIDE 2000 MG: 2 INJECTION, SOLUTION INTRAVENOUS at 20:37

## 2021-01-01 RX ADMIN — GABAPENTIN 300 MG: 300 CAPSULE ORAL at 15:35

## 2021-01-01 RX ADMIN — CLONAZEPAM 1 MG: 1 TABLET ORAL at 21:11

## 2021-01-01 RX ADMIN — LEVOTHYROXINE SODIUM 150 MCG: 75 TABLET ORAL at 08:40

## 2021-01-01 RX ADMIN — LEVALBUTEROL HYDROCHLORIDE 1.25 MG: 1.25 SOLUTION, CONCENTRATE RESPIRATORY (INHALATION) at 08:08

## 2021-01-01 RX ADMIN — DEXMEDETOMIDINE 0.4 MCG/KG/HR: 100 INJECTION, SOLUTION, CONCENTRATE INTRAVENOUS at 09:21

## 2021-01-01 RX ADMIN — LAMOTRIGINE 200 MG: 100 TABLET ORAL at 08:40

## 2021-01-01 RX ADMIN — VANCOMYCIN HYDROCHLORIDE 1750 MG: 5 INJECTION, POWDER, LYOPHILIZED, FOR SOLUTION INTRAVENOUS at 22:30

## 2021-01-01 RX ADMIN — VANCOMYCIN HYDROCHLORIDE 750 MG: 750 INJECTION, SOLUTION INTRAVENOUS at 02:33

## 2021-01-01 RX ADMIN — IPRATROPIUM BROMIDE 0.5 MG: 0.5 SOLUTION RESPIRATORY (INHALATION) at 08:08

## 2021-01-01 RX ADMIN — ENOXAPARIN SODIUM 40 MG: 100 INJECTION SUBCUTANEOUS at 20:37

## 2021-01-01 RX ADMIN — SODIUM CHLORIDE 1000 ML: 0.9 INJECTION, SOLUTION INTRAVENOUS at 20:49

## 2021-01-01 RX ADMIN — IPRATROPIUM BROMIDE 0.5 MG: 0.5 SOLUTION RESPIRATORY (INHALATION) at 19:22

## 2021-01-01 RX ADMIN — LEVALBUTEROL HYDROCHLORIDE 1.25 MG: 1.25 SOLUTION, CONCENTRATE RESPIRATORY (INHALATION) at 19:12

## 2021-01-01 RX ADMIN — CEFEPIME HYDROCHLORIDE 2000 MG: 2 INJECTION, SOLUTION INTRAVENOUS at 11:35

## 2021-01-01 RX ADMIN — INSULIN LISPRO 2 UNITS: 100 INJECTION, SOLUTION INTRAVENOUS; SUBCUTANEOUS at 17:48

## 2021-01-01 RX ADMIN — GABAPENTIN 600 MG: 300 CAPSULE ORAL at 17:15

## 2021-01-01 RX ADMIN — LORAZEPAM 0.5 MG: 2 INJECTION INTRAMUSCULAR; INTRAVENOUS at 03:06

## 2021-01-01 RX ADMIN — VANCOMYCIN HYDROCHLORIDE 1250 MG: 1 INJECTION, POWDER, LYOPHILIZED, FOR SOLUTION INTRAVENOUS at 10:33

## 2021-01-01 RX ADMIN — METHYLPREDNISOLONE SODIUM SUCCINATE 125 MG: 125 INJECTION, POWDER, FOR SOLUTION INTRAMUSCULAR; INTRAVENOUS at 20:51

## 2021-01-01 RX ADMIN — IPRATROPIUM BROMIDE 0.5 MG: 0.5 SOLUTION RESPIRATORY (INHALATION) at 07:16

## 2021-01-01 RX ADMIN — HYDROMORPHONE HYDROCHLORIDE 1 MG: 1 INJECTION, SOLUTION INTRAMUSCULAR; INTRAVENOUS; SUBCUTANEOUS at 09:00

## 2021-01-01 RX ADMIN — HEPARIN SODIUM 13.1 UNITS/KG/HR: 10000 INJECTION, SOLUTION INTRAVENOUS at 11:28

## 2021-01-01 RX ADMIN — AZITHROMYCIN MONOHYDRATE 500 MG: 500 INJECTION, POWDER, LYOPHILIZED, FOR SOLUTION INTRAVENOUS at 21:04

## 2021-01-01 RX ADMIN — CEFEPIME HYDROCHLORIDE 2000 MG: 2 INJECTION, SOLUTION INTRAVENOUS at 21:45

## 2021-01-01 RX ADMIN — IPRATROPIUM BROMIDE 0.5 MG: 0.5 SOLUTION RESPIRATORY (INHALATION) at 13:59

## 2021-01-01 RX ADMIN — LEVALBUTEROL HYDROCHLORIDE 1.25 MG: 1.25 SOLUTION, CONCENTRATE RESPIRATORY (INHALATION) at 07:41

## 2021-01-01 RX ADMIN — IPRATROPIUM BROMIDE 0.5 MG: 0.5 SOLUTION RESPIRATORY (INHALATION) at 13:08

## 2021-01-01 RX ADMIN — PANTOPRAZOLE SODIUM 40 MG: 40 INJECTION, POWDER, FOR SOLUTION INTRAVENOUS at 15:35

## 2021-01-01 RX ADMIN — LEVALBUTEROL HYDROCHLORIDE 1.25 MG: 1.25 SOLUTION, CONCENTRATE RESPIRATORY (INHALATION) at 19:22

## 2021-01-01 RX ADMIN — FUROSEMIDE 40 MG: 10 INJECTION, SOLUTION INTRAVENOUS at 03:06

## 2021-01-01 RX ADMIN — PRAVASTATIN SODIUM 40 MG: 40 TABLET ORAL at 17:41

## 2021-01-01 RX ADMIN — LEVALBUTEROL HYDROCHLORIDE 1.25 MG: 1.25 SOLUTION, CONCENTRATE RESPIRATORY (INHALATION) at 07:16

## 2021-01-01 RX ADMIN — FUROSEMIDE 40 MG: 10 INJECTION, SOLUTION INTRAVENOUS at 08:53

## 2021-01-01 RX ADMIN — OXYBUTYNIN CHLORIDE 15 MG: 5 TABLET, EXTENDED RELEASE ORAL at 08:40

## 2021-01-01 RX ADMIN — INSULIN LISPRO 4 UNITS: 100 INJECTION, SOLUTION INTRAVENOUS; SUBCUTANEOUS at 17:42

## 2021-01-01 RX ADMIN — INSULIN LISPRO 2 UNITS: 100 INJECTION, SOLUTION INTRAVENOUS; SUBCUTANEOUS at 23:10

## 2021-01-01 RX ADMIN — QUETIAPINE FUMARATE 400 MG: 100 TABLET ORAL at 21:11

## 2021-01-01 RX ADMIN — VANCOMYCIN HYDROCHLORIDE 750 MG: 750 INJECTION, SOLUTION INTRAVENOUS at 14:27

## 2021-01-01 RX ADMIN — INSULIN LISPRO 2 UNITS: 100 INJECTION, SOLUTION INTRAVENOUS; SUBCUTANEOUS at 00:15

## 2021-01-01 RX ADMIN — GABAPENTIN 600 MG: 300 CAPSULE ORAL at 08:40

## 2021-01-01 RX ADMIN — MORPHINE SULFATE 2 MG: 2 INJECTION, SOLUTION INTRAMUSCULAR; INTRAVENOUS at 07:23

## 2021-01-01 RX ADMIN — CEFEPIME HYDROCHLORIDE 2000 MG: 2 INJECTION, SOLUTION INTRAVENOUS at 08:26

## 2021-01-01 RX ADMIN — POTASSIUM CHLORIDE 40 MEQ: 1500 TABLET, EXTENDED RELEASE ORAL at 08:53

## 2021-01-01 RX ADMIN — HEPARIN SODIUM 11.1 UNITS/KG/HR: 10000 INJECTION, SOLUTION INTRAVENOUS at 08:37

## 2021-01-01 RX ADMIN — VANCOMYCIN HYDROCHLORIDE 1250 MG: 1 INJECTION, POWDER, LYOPHILIZED, FOR SOLUTION INTRAVENOUS at 22:31

## 2021-01-01 RX ADMIN — PRAVASTATIN SODIUM 40 MG: 40 TABLET ORAL at 17:15

## 2021-01-01 RX ADMIN — LEVALBUTEROL HYDROCHLORIDE 1.25 MG: 1.25 SOLUTION, CONCENTRATE RESPIRATORY (INHALATION) at 07:43

## 2021-01-01 RX ADMIN — IPRATROPIUM BROMIDE 0.5 MG: 0.5 SOLUTION RESPIRATORY (INHALATION) at 13:32

## 2021-01-01 RX ADMIN — LEVALBUTEROL HYDROCHLORIDE 1.25 MG: 1.25 SOLUTION, CONCENTRATE RESPIRATORY (INHALATION) at 13:32

## 2021-01-01 RX ADMIN — CLONAZEPAM 1 MG: 1 TABLET ORAL at 17:15

## 2021-01-01 RX ADMIN — VANCOMYCIN HYDROCHLORIDE 750 MG: 750 INJECTION, SOLUTION INTRAVENOUS at 03:46

## 2021-01-01 RX ADMIN — DULOXETINE 60 MG: 30 CAPSULE, DELAYED RELEASE ORAL at 08:40

## 2021-01-01 RX ADMIN — AMLODIPINE BESYLATE 2.5 MG: 2.5 TABLET ORAL at 08:40

## 2021-01-01 RX ADMIN — INSULIN LISPRO 6 UNITS: 100 INJECTION, SOLUTION INTRAVENOUS; SUBCUTANEOUS at 11:20

## 2021-01-01 RX ADMIN — HEPARIN SODIUM 2000 UNITS: 1000 INJECTION, SOLUTION INTRAVENOUS; SUBCUTANEOUS at 05:09

## 2021-01-01 RX ADMIN — HYDROXYZINE HYDROCHLORIDE 50 MG: 25 TABLET ORAL at 05:20

## 2021-01-01 RX ADMIN — HYDROMORPHONE HYDROCHLORIDE 1 MG: 1 INJECTION, SOLUTION INTRAMUSCULAR; INTRAVENOUS; SUBCUTANEOUS at 10:18

## 2021-01-01 RX ADMIN — CEFEPIME HYDROCHLORIDE 2000 MG: 2 INJECTION, SOLUTION INTRAVENOUS at 21:21

## 2021-01-01 RX ADMIN — CEFEPIME HYDROCHLORIDE 2000 MG: 2 INJECTION, SOLUTION INTRAVENOUS at 08:33

## 2021-01-01 RX ADMIN — LEVALBUTEROL HYDROCHLORIDE 1.25 MG: 1.25 SOLUTION, CONCENTRATE RESPIRATORY (INHALATION) at 13:08

## 2021-01-01 RX ADMIN — MORPHINE SULFATE 1 MG: 2 INJECTION, SOLUTION INTRAMUSCULAR; INTRAVENOUS at 05:08

## 2021-01-01 RX ADMIN — CLONAZEPAM 1 MG: 1 TABLET ORAL at 08:40

## 2021-01-01 RX ADMIN — ACETAMINOPHEN 650 MG: 325 TABLET, FILM COATED ORAL at 03:16

## 2021-01-01 RX ADMIN — HEPARIN SODIUM 4000 UNITS: 1000 INJECTION, SOLUTION INTRAVENOUS; SUBCUTANEOUS at 13:07

## 2021-01-01 RX ADMIN — INSULIN LISPRO 2 UNITS: 100 INJECTION, SOLUTION INTRAVENOUS; SUBCUTANEOUS at 23:55

## 2021-01-01 RX ADMIN — LEVALBUTEROL HYDROCHLORIDE 1.25 MG: 1.25 SOLUTION, CONCENTRATE RESPIRATORY (INHALATION) at 19:23

## 2021-01-01 RX ADMIN — LEVALBUTEROL HYDROCHLORIDE 1.25 MG: 1.25 SOLUTION, CONCENTRATE RESPIRATORY (INHALATION) at 13:59

## 2021-01-01 RX ADMIN — IPRATROPIUM BROMIDE 0.5 MG: 0.5 SOLUTION RESPIRATORY (INHALATION) at 07:41

## 2021-01-01 RX ADMIN — PERFLUTREN 0.8 ML/MIN: 6.52 INJECTION, SUSPENSION INTRAVENOUS at 11:17

## 2021-01-01 RX ADMIN — CEFEPIME HYDROCHLORIDE 2000 MG: 2 INJECTION, SOLUTION INTRAVENOUS at 21:11

## 2021-01-01 RX ADMIN — HEPARIN SODIUM 5000 UNITS: 5000 INJECTION INTRAVENOUS; SUBCUTANEOUS at 05:42

## 2021-01-01 RX ADMIN — HEPARIN SODIUM 2000 UNITS: 1000 INJECTION, SOLUTION INTRAVENOUS; SUBCUTANEOUS at 09:19

## 2021-01-01 RX ADMIN — MAGNESIUM SULFATE HEPTAHYDRATE 2 G: 40 INJECTION, SOLUTION INTRAVENOUS at 20:51

## 2021-01-01 RX ADMIN — ASPIRIN 300 MG: 300 SUPPOSITORY RECTAL at 23:16

## 2021-01-01 RX ADMIN — INSULIN LISPRO 2 UNITS: 100 INJECTION, SOLUTION INTRAVENOUS; SUBCUTANEOUS at 05:16

## 2021-01-01 RX ADMIN — HYDROMORPHONE HYDROCHLORIDE 1 MG/HR: 10 INJECTION, SOLUTION INTRAMUSCULAR; INTRAVENOUS; SUBCUTANEOUS at 09:22

## 2021-01-01 RX ADMIN — FUROSEMIDE 40 MG: 10 INJECTION, SOLUTION INTRAVENOUS at 09:16

## 2021-01-01 RX ADMIN — INSULIN LISPRO 2 UNITS: 100 INJECTION, SOLUTION INTRAVENOUS; SUBCUTANEOUS at 18:30

## 2021-01-01 RX ADMIN — ACETAMINOPHEN 650 MG: 650 SUPPOSITORY RECTAL at 18:26

## 2021-01-01 RX ADMIN — DIPHENHYDRAMINE HYDROCHLORIDE 25 MG: 50 INJECTION INTRAMUSCULAR; INTRAVENOUS at 05:08

## 2021-01-01 RX ADMIN — GABAPENTIN 600 MG: 300 CAPSULE ORAL at 21:11

## 2021-01-01 RX ADMIN — IPRATROPIUM BROMIDE 0.5 MG: 0.5 SOLUTION RESPIRATORY (INHALATION) at 19:12

## 2021-01-01 RX ADMIN — INSULIN LISPRO 6 UNITS: 100 INJECTION, SOLUTION INTRAVENOUS; SUBCUTANEOUS at 12:31

## 2021-11-19 PROBLEM — R77.8 ELEVATED TROPONIN: Status: ACTIVE | Noted: 2021-01-01

## 2021-11-19 PROBLEM — R65.10 SIRS (SYSTEMIC INFLAMMATORY RESPONSE SYNDROME) (HCC): Status: ACTIVE | Noted: 2021-01-01

## 2021-11-19 PROBLEM — J96.01 ACUTE RESPIRATORY FAILURE WITH HYPOXIA (HCC): Status: ACTIVE | Noted: 2021-01-01

## 2021-11-19 PROBLEM — A41.9 SEVERE SEPSIS (HCC): Status: ACTIVE | Noted: 2021-01-01

## 2021-11-19 PROBLEM — R65.20 SEVERE SEPSIS (HCC): Status: ACTIVE | Noted: 2021-01-01

## 2021-11-19 PROBLEM — N17.9 AKI (ACUTE KIDNEY INJURY) (HCC): Status: ACTIVE | Noted: 2021-01-01

## 2021-11-20 PROBLEM — R79.89 ELEVATED D-DIMER: Status: ACTIVE | Noted: 2021-01-01

## 2021-11-20 PROBLEM — N39.0 UTI (URINARY TRACT INFECTION): Status: ACTIVE | Noted: 2021-01-01

## 2021-11-21 PROBLEM — N17.9 AKI (ACUTE KIDNEY INJURY) (HCC): Status: RESOLVED | Noted: 2021-01-01 | Resolved: 2021-01-01

## 2021-11-22 PROBLEM — G93.40 ENCEPHALOPATHY ACUTE: Status: ACTIVE | Noted: 2021-01-01

## 2021-11-22 PROBLEM — D64.9 ANEMIA: Status: ACTIVE | Noted: 2021-01-01

## 2021-11-22 PROBLEM — R13.10 DYSPHAGIA: Status: ACTIVE | Noted: 2021-01-01

## 2021-11-22 PROBLEM — E11.9 DM2 (DIABETES MELLITUS, TYPE 2) (HCC): Status: ACTIVE | Noted: 2021-01-01

## 2021-11-25 LAB
BACTERIA BLD CULT: NORMAL
BACTERIA BLD CULT: NORMAL

## 2021-11-27 LAB — BACTERIA BLD CULT: NORMAL

## 2021-11-28 LAB — BACTERIA BLD CULT: NORMAL
